# Patient Record
Sex: MALE | Race: WHITE | URBAN - METROPOLITAN AREA
[De-identification: names, ages, dates, MRNs, and addresses within clinical notes are randomized per-mention and may not be internally consistent; named-entity substitution may affect disease eponyms.]

---

## 2018-04-11 ENCOUNTER — OUTPATIENT (OUTPATIENT)
Dept: OUTPATIENT SERVICES | Facility: HOSPITAL | Age: 68
LOS: 1 days | End: 2018-04-11
Payer: MEDICARE

## 2018-04-11 DIAGNOSIS — M54.16 RADICULOPATHY, LUMBAR REGION: ICD-10-CM

## 2018-04-11 PROCEDURE — 62323 NJX INTERLAMINAR LMBR/SAC: CPT

## 2018-04-11 PROCEDURE — 77003 FLUOROGUIDE FOR SPINE INJECT: CPT

## 2019-07-31 ENCOUNTER — OUTPATIENT (OUTPATIENT)
Dept: OUTPATIENT SERVICES | Facility: HOSPITAL | Age: 69
LOS: 1 days | End: 2019-07-31
Payer: MEDICARE

## 2019-07-31 DIAGNOSIS — M54.12 RADICULOPATHY, CERVICAL REGION: ICD-10-CM

## 2019-07-31 PROCEDURE — 77003 FLUOROGUIDE FOR SPINE INJECT: CPT

## 2019-07-31 PROCEDURE — 62321 NJX INTERLAMINAR CRV/THRC: CPT

## 2020-09-14 ENCOUNTER — APPOINTMENT (OUTPATIENT)
Dept: ORTHOPEDIC SURGERY | Facility: CLINIC | Age: 70
End: 2020-09-14
Payer: MEDICARE

## 2020-09-14 VITALS — WEIGHT: 160 LBS | BODY MASS INDEX: 23.7 KG/M2 | HEIGHT: 69 IN

## 2020-09-14 PROCEDURE — 72100 X-RAY EXAM L-S SPINE 2/3 VWS: CPT

## 2020-09-14 PROCEDURE — 99214 OFFICE O/P EST MOD 30 MIN: CPT

## 2020-09-14 PROCEDURE — 72170 X-RAY EXAM OF PELVIS: CPT

## 2020-09-14 NOTE — PHYSICAL EXAM
[de-identified] : Constitutional\par o Appearance : well-nourished, well developed, alert, in no acute distress \par Head and Face\par o Head :\par ¦ Inspection : atraumatic, normocephalic\par o Face :\par ¦ Inspection : no visible rash or discoloration\par Respiratory\par o Respiratory Effort: breathing unlabored \par Neurologic\par o Mental Status Examination :\par ¦ Orientation : alert and oriented X 3\par Psychiatric\par o Mood and Affect: mood normal, affect appropriate\par Cardiovascular\par o Observation/Palpation : - no swelling\par Lymphatic\par o Additional Nodes : No palpable lymph nodes present\par \par Cervical Spine\par o Inspection/Palpation :\par ¦ Inspection : alignment midline, normal degree of lordosis present\par ¦ Skin : normal appearance, no masses or tenderness, trachea midline\par ¦ Palpation : musculature is nontender to palpation\par o Range of Motion : limited rotation to the right, no crepitance or pain with ROM \par \par Lumbosacral Spine\par o Inspection : no visible rash or discoloration\par o Palpation : paraspinal musculature nontender\par o Range of Motion : extension and sidebending cause mild back discomfort but no radicular pain, rotation to the left causes discomfort\par o Muscle Strength : paraspinal muscle strength and tone within normal limits\par o Muscle Tone : paraspinal muscle strength and tone within normal limits\par Tests: straight leg test negative, SCOTT test negative\par  \par Right Lower Extremity\par o Buttock : no tenderness, swelling or deformities\par o Right Hip :\par ¦ Inspection/Palpation : no tenderness, no greater trochanteric or IT band tenderness, no swelling or deformities\par ¦ Range of Motion : full and painless in all planes, no crepitance\par ¦ Stability : joint stability intact\par ¦ Strength : extension, flexion, adduction, abduction, internal rotation and external rotation, 4-/5 \par \par Left Lower Extremity\par o Buttock : no tenderness, swelling or deformities \par o Left Hip :\par ¦ Inspection/Palpation : no tenderness, no greater trochanteric or IT band tenderness, no swelling or deformities\par ¦ Range of Motion : full and painless in all planes, no crepitance\par ¦ Stability : joint stability intact\par ¦ Strength : extension, flexion, adduction, abduction, internal rotation and external rotation, 4-/5\par  \par Gait: gait normal, no significant extremity swelling or lymphedema, no drop foot, tight hamstrings bilaterally, reasonably good core strength\par \par Radiology Results \par o Lumbosacral Spine : AP and lateral views were obtained and revealed grade 1 spondylolisthesis of L5 on S1, diffuse degenerative disc disease worse at L5/S1, T12/L1 and L1/L2, there is mild scoliosis, evidence of an open heart surgery with sternal wires present.\par o Pelvis: AP pelvis was obtained and revealed moderate arthritis of both hips, no lytic lesions of either femur or pelvis.

## 2020-09-14 NOTE — HISTORY OF PRESENT ILLNESS
[de-identified] : 70 year old male presents for follow up of his low back pain. He also complains of bilateral hand numbness. He is s/p cervical spine fusion done in 2016. He has been seen for these issues in the past. He is known to have cervical and lumbar radiculopathy and neuropathy. He had been doing PT until the COVID-19 pandemic and was unable to continue. He feels that his back discomfort has gotten worse, especially on the right side. He notes pain when standing and walking for long periods of time. He has occasional numbness and tingling into his legs but never past his knees, right worse than left. He does not have pain when sleeping at night. He would like to go back to PT. He has not been diligent with home exercises. He has had injections into his cervical and lumbar spine, most recently about 1 year ago. He notes that he has lost weight since his last visit, about 5 pounds. Pmhx: He is s/p bypass surgery, CABG x 4 in 2015. His last cardiac stent was in December 2019.  He is known to have a chronic neuropathy due to this cervical fusion and understands that his symptoms will not improve with treatment.  They however can be managed with activity modification and exercise

## 2020-09-14 NOTE — DISCUSSION/SUMMARY
[de-identified] : I discussed the underlying pathophysiology of the patient's condition in great detail with the patient. I went over the patient's x-rays with them in great detail. We discussed the use of ice, Tylenol and anti-inflammatories to relieve pain. The patient was instructed in ROM exercises they are to do at home. At-home strengthening, and stretching exercises were discussed and demonstrated with the patient. We went over the wide ranging benefits of exercise for the patient health and I encouraged him to begin a diligent exercise program. He should focus on light weight and high repetition exercises. He needs to avoid high-impact activities such as running and jumping. I recommend alternative activities such as riding a stationary bike on low tension, or the elliptical. He should avoid arching and twisting. At this time, he will start a course of physical therapy for strengthening and flexibility. A prescription for physical therapy was provided. All of his questions were answered. He understands and consents to the plan. \par \par FU 1 month.\par after undergoing PT for his low back.

## 2020-09-14 NOTE — ADDENDUM
[FreeTextEntry1] : I, Moe Genao, acted solely as a scribe for Dr. Dakotah Swann on this date 09/14/2020.\par All medical record entries made by the Scribe were at my, Dr. Dakotah Swann, direction and personally dictated by me on 09/14/2020. I have reviewed the chart and agree that the record accurately reflects my personal performance of the history, physical exam, assessment and plan. I have also personally directed, reviewed, and agreed with the chart.

## 2020-10-26 ENCOUNTER — APPOINTMENT (OUTPATIENT)
Dept: ORTHOPEDIC SURGERY | Facility: CLINIC | Age: 70
End: 2020-10-26
Payer: MEDICARE

## 2020-10-26 VITALS — BODY MASS INDEX: 23.7 KG/M2 | WEIGHT: 160 LBS | HEIGHT: 69 IN

## 2020-10-26 DIAGNOSIS — M43.10 SPONDYLOLISTHESIS, SITE UNSPECIFIED: ICD-10-CM

## 2020-10-26 PROCEDURE — 99214 OFFICE O/P EST MOD 30 MIN: CPT

## 2020-10-26 PROCEDURE — 99072 ADDL SUPL MATRL&STAF TM PHE: CPT

## 2020-12-10 ENCOUNTER — APPOINTMENT (OUTPATIENT)
Dept: ORTHOPEDIC SURGERY | Facility: CLINIC | Age: 70
End: 2020-12-10

## 2021-07-30 ENCOUNTER — APPOINTMENT (OUTPATIENT)
Dept: ORTHOPEDIC SURGERY | Facility: CLINIC | Age: 71
End: 2021-07-30

## 2021-08-11 ENCOUNTER — RESULT REVIEW (OUTPATIENT)
Age: 71
End: 2021-08-11

## 2021-09-30 ENCOUNTER — APPOINTMENT (OUTPATIENT)
Dept: ORTHOPEDIC SURGERY | Facility: CLINIC | Age: 71
End: 2021-09-30
Payer: MEDICARE

## 2021-09-30 DIAGNOSIS — M41.9 SCOLIOSIS, UNSPECIFIED: ICD-10-CM

## 2021-09-30 DIAGNOSIS — M47.816 SPONDYLOSIS W/OUT MYELOPATHY OR RADICULOPATHY, LUMBAR REGION: ICD-10-CM

## 2021-09-30 DIAGNOSIS — M51.36 OTHER INTERVERTEBRAL DISC DEGENERATION, LUMBAR REGION: ICD-10-CM

## 2021-09-30 DIAGNOSIS — M14.68: ICD-10-CM

## 2021-09-30 DIAGNOSIS — M48.061 SPINAL STENOSIS, LUMBAR REGION WITHOUT NEUROGENIC CLAUDICATION: ICD-10-CM

## 2021-09-30 PROCEDURE — 99214 OFFICE O/P EST MOD 30 MIN: CPT

## 2021-09-30 PROCEDURE — 72170 X-RAY EXAM OF PELVIS: CPT

## 2021-09-30 PROCEDURE — 72100 X-RAY EXAM L-S SPINE 2/3 VWS: CPT

## 2021-09-30 NOTE — HISTORY OF PRESENT ILLNESS
[de-identified] : 71 year old male presents complaining of low back pain. He notes radiating pain into his legs if he stands for a long time. He has numbness and tingling into feet. He notes that he is having a lot of trouble walking. He has been losing his balance and does fall. He uses a cane mostly in his left hand. He is walking very carefully and is hesitant on slopes. He considers his left side better than his right. He has been going to PT but does not feel the treatment is appropriate. \par He also complains of bilateral hand numbness. He is known to have chronic neuropathy secondary to cervical stenosis s/p cervical spine fusion in 2016. He understands that his neck symptoms will not improve with treatment. They however can be managed with activity modification and exercise. He is on Neurontin, and has had injections into his cervical and lumbar spine in the past. Pmhx: He is s/p CABG x 4 in 2015. His last cardiac stent was 12/2019. He is on baby aspirin and no other blood thinners. He had cataract surgery 3 weeks ago.

## 2021-09-30 NOTE — DISCUSSION/SUMMARY
[de-identified] : I discussed the underlying pathophysiology of the patient's condition in great detail with the patient. I went over the patient's x-rays with them in great detail. I not think repeat EMG/NCS are appropriate at this time. At this time, I am recommending a course of physical therapy with an intensive sports therapist for strengthening and flexibility. I am sending him to [facility]. A prescription was provided. All of his questions were answered. He understands and consents to the plan.\par \par FU 1 month.\par after undergoing PT for his lower back.

## 2021-09-30 NOTE — ADDENDUM
[FreeTextEntry1] : I, Moe Genao, acted solely as a scribe for Dr. Dakotah Swann on this date 09/30/2021.\par All medical record entries made by the Scribe were at my, Dr. Dakotah Swann, direction and personally dictated by me on 09/30/2021. I have reviewed the chart and agree that the record accurately reflects my personal performance of the history, physical exam, assessment and plan. I have also personally directed, reviewed, and agreed with the chart.

## 2021-09-30 NOTE — PHYSICAL EXAM
[de-identified] : Constitutional\par o Appearance : well-nourished, well developed, alert, in no acute distress \par Head and Face\par o Head :\par ¦ Inspection : atraumatic, normocephalic\par o Face :\par ¦ Inspection : no visible rash or discoloration\par Respiratory\par o Respiratory Effort: breathing unlabored \par Neurologic\par o Mental Status Examination :\par ¦ Orientation : alert and oriented X 3\par Psychiatric\par o Mood and Affect: mood normal, affect appropriate\par Cardiovascular\par o Observation/Palpation : - no swelling\par Lymphatic\par o Additional Nodes : No palpable lymph nodes present\par \par Cervical Spine\par o Inspection/Palpation :\par ¦ Inspection : alignment midline, normal degree of lordosis present\par ¦ Skin : normal appearance, no masses or tenderness, trachea midline\par ¦ Palpation : musculature is nontender to palpation\par o Range of Motion : limited rotation to the right, no crepitance or pain with ROM \par \par Lumbosacral Spine\par o Inspection : no visible rash or discoloration\par o Palpation : paraspinal musculature nontender\par o Range of Motion : extension causes discomfort, sidebending to the left causes buttock discomfort, rotation causes lower back discomfort\par o Muscle Strength : paraspinal muscle strength and tone within normal limits\par o Muscle Tone : paraspinal muscle strength and tone within normal limits\par Tests: straight leg test negative, SCOTT test negative\par  \par Right Lower Extremity\par o Buttock : no tenderness, swelling or deformities\par o Right Hip :\par ¦ Inspection/Palpation : no greater trochanteric or IT band tenderness, no swelling or deformities\par ¦ Range of Motion : full and painless in all planes, no crepitance\par ¦ Stability : joint stability intact\par ¦ Strength : extension, flexion 4-/5, adduction, abduction, internal rotation and external rotation\par \par o Right Knee :\par ¦ Strength : hamstrings 4-/5, extension 5/5\par \par Left Lower Extremity\par o Buttock : no tenderness, swelling or deformities \par o Left Hip :\par ¦ Inspection/Palpation : no greater trochanteric or IT band tenderness, no swelling or deformities\par ¦ Range of Motion : full and painless in all planes, no crepitance\par ¦ Stability : joint stability intact\par ¦ Strength : extension, flexion, adduction, abduction, internal rotation and external rotation, 4-/5\par \par o Left Knee\par ¦ Strength : hamstrings 4/5, extension 5/5 \par Gait: short choppy steps, poor balance, mildly positive Romberg's test, no significant extremity swelling or lymphedema, no drop foot bilaterally but lacks full dorsiflexion of the right foot, symmetrical sensation to light touch, very tight hamstrings bilaterally, good core strength\par \par Radiology Results\par o Lumbosacral Spine : AP and lateral views were obtained and revealed a grade 1-2 spondylolisthesis of L5 on S1 with severe DDD at L5/S1. Degenerative disc disease diffusely from the lower thoracic and upper lumbar spine. Diffuse foraminal stenosis. Degenerative scoliosis.\par o Hip and Pelvis: AP pelvis was obtained and revealed mild degenerative arthritis of both hips.

## 2022-02-16 ENCOUNTER — APPOINTMENT (OUTPATIENT)
Dept: MRI IMAGING | Facility: CLINIC | Age: 72
End: 2022-02-16
Payer: MEDICARE

## 2022-02-16 PROCEDURE — 74183 MRI ABD W/O CNTR FLWD CNTR: CPT | Mod: MH

## 2022-02-16 PROCEDURE — A9585: CPT

## 2022-10-01 ENCOUNTER — EMERGENCY (EMERGENCY)
Facility: HOSPITAL | Age: 72
LOS: 1 days | Discharge: HOME | End: 2022-10-01
Attending: INTERNAL MEDICINE | Admitting: INTERNAL MEDICINE
Payer: MEDICARE

## 2022-10-01 VITALS
OXYGEN SATURATION: 97 % | DIASTOLIC BLOOD PRESSURE: 62 MMHG | RESPIRATION RATE: 16 BRPM | HEART RATE: 63 BPM | SYSTOLIC BLOOD PRESSURE: 106 MMHG | TEMPERATURE: 99 F

## 2022-10-01 VITALS
RESPIRATION RATE: 16 BRPM | DIASTOLIC BLOOD PRESSURE: 56 MMHG | HEIGHT: 64 IN | HEART RATE: 77 BPM | SYSTOLIC BLOOD PRESSURE: 119 MMHG | OXYGEN SATURATION: 96 % | WEIGHT: 160.06 LBS

## 2022-10-01 PROCEDURE — 72100 X-RAY EXAM L-S SPINE 2/3 VWS: CPT

## 2022-10-01 PROCEDURE — 99284 EMERGENCY DEPT VISIT MOD MDM: CPT

## 2022-10-01 PROCEDURE — 99284 EMERGENCY DEPT VISIT MOD MDM: CPT | Mod: 25

## 2022-10-01 PROCEDURE — 73030 X-RAY EXAM OF SHOULDER: CPT

## 2022-10-01 PROCEDURE — 73030 X-RAY EXAM OF SHOULDER: CPT | Mod: 26,50

## 2022-10-01 PROCEDURE — 72100 X-RAY EXAM L-S SPINE 2/3 VWS: CPT | Mod: 26

## 2022-10-01 RX ORDER — TRAMADOL HYDROCHLORIDE 50 MG/1
25 TABLET ORAL ONCE
Refills: 0 | Status: DISCONTINUED | OUTPATIENT
Start: 2022-10-01 | End: 2022-10-01

## 2022-10-01 RX ORDER — TRAMADOL HYDROCHLORIDE 50 MG/1
1 TABLET ORAL
Qty: 15 | Refills: 0
Start: 2022-10-01 | End: 2022-10-05

## 2022-10-01 RX ADMIN — TRAMADOL HYDROCHLORIDE 25 MILLIGRAM(S): 50 TABLET ORAL at 03:25

## 2022-10-01 RX ADMIN — TRAMADOL HYDROCHLORIDE 25 MILLIGRAM(S): 50 TABLET ORAL at 03:34

## 2022-10-01 NOTE — ED ADULT TRIAGE NOTE - BP NONINVASIVE SYSTOLIC (MM HG)
119 Hypokalemia, hypophosphatemia, hypomagnesemia  IV & PO supplementation  *Note: Mag stable at 1.4 (10/17 & 10/18) will continue current PO regimen without change

## 2022-10-01 NOTE — ED ADULT TRIAGE NOTE - CHIEF COMPLAINT QUOTE
covid + as of 9/29,  s/p mechanical trip and fall getting out of bed c/o back and right shoulder pain

## 2022-10-01 NOTE — ED ADULT NURSE NOTE - OBJECTIVE STATEMENT
received pt stable awake  alert oriented x4 no distress noted pt  s/p fall and c/o rt shoulder pain and back pain  pt evaluated  awaiting xray

## 2022-10-01 NOTE — ED PROVIDER NOTE - CLINICAL SUMMARY MEDICAL DECISION MAKING FREE TEXT BOX
S/P fall 24 hours ago, patient is ambulatory, x-rays with marked degenerative changes, no acute fx, improved with Tramadol, stable on discharge, ortho referral and rheumatology referral given.

## 2022-10-01 NOTE — ED ADULT NURSE REASSESSMENT NOTE - NS ED NURSE REASSESS COMMENT FT1
pt reavaluated and xray done medicated with tramadol po as ordered and d/c home to follow up awaiting wife to pck up

## 2022-10-01 NOTE — ED ADULT NURSE NOTE - NSIMPLEMENTINTERV_GEN_ALL_ED
Implemented All Fall Risk Interventions:  Council to call system. Call bell, personal items and telephone within reach. Instruct patient to call for assistance. Room bathroom lighting operational. Non-slip footwear when patient is off stretcher. Physically safe environment: no spills, clutter or unnecessary equipment. Stretcher in lowest position, wheels locked, appropriate side rails in place. Provide visual cue, wrist band, yellow gown, etc. Monitor gait and stability. Monitor for mental status changes and reorient to person, place, and time. Review medications for side effects contributing to fall risk. Reinforce activity limits and safety measures with patient and family.

## 2022-10-01 NOTE — ED PROVIDER NOTE - SIGNIFICANT NEGATIVE FINDINGS
no headache, no neck pain,  no chest pain, no SOB, no palpitations, no n/v, no bladder no bowel changes,  no neuro changes.

## 2022-10-01 NOTE — ED PROVIDER NOTE - PROVIDER TOKENS
PROVIDER:[TOKEN:[[id number]],FOLLOWUP:[1-3 Days]],PROVIDER:[TOKEN:[[id number]],FOLLOWUP:[1-3 Days]]

## 2022-10-01 NOTE — ED PROVIDER NOTE - NSFOLLOWUPINSTRUCTIONS_ED_ALL_ED_FT
1) Follow-up with your Primary Medical Doctor or referred doctor. Call today / next business day for prompt follow-up.  2) With back pain, whether it is a new pain or a chronic pain, it is very important to have close, prompt outpatient follow-up for continued workup, evaluation and definitive diagnosis.  If you develop worsening or persistent pain, any numbness, tingling, weakness of one or both of your legs, any fever, or any changes / difficulty urinating then you will need to see your back doctor immediately or you can return to the emergency room.  Many times your doctor will need to set you up for further imaging / testing such as an MRI.  3) See attached instruction sheets for additional information, including information regarding signs and symptoms to look out for, reasons to seek immediate care and other important instructions.  4) Follow-up with your Orthopedist or Neurosurgeon as soon as possible. If you do not have one then you will need to call the referred doctor as soon as possible (today / next business day) for prompt follow-up for further workup and treatment. See the referred doctor for information. orthopedic and rheumatology referrals given. Call for an appointment with the specialists   1) Follow-up with your Primary Medical Doctor or referred doctor. Call today / next business day for prompt follow-up.  2) With back pain, whether it is a new pain or a chronic pain, it is very important to have close, prompt outpatient follow-up for continued workup, evaluation and definitive diagnosis.  If you develop worsening or persistent pain, any numbness, tingling, weakness of one or both of your legs, any fever, or any changes / difficulty urinating then you will need to see your back doctor immediately or you can return to the emergency room.  Many times your doctor will need to set you up for further imaging / testing such as an MRI.  3) See attached instruction sheets for additional information, including information regarding signs and symptoms to look out for, reasons to seek immediate care and other important instructions.  4) Follow-up with your Orthopedist or Neurosurgeon as soon as possible. If you do not have one then you will need to call the referred doctor as soon as possible (today / next business day) for prompt follow-up for further workup and treatment. See the referred doctor for information.

## 2022-10-01 NOTE — ED PROVIDER NOTE - OBJECTIVE STATEMENT
fell 24 hours ago, couldn't sleep last night  B/L shoulder and back pain 71 y/o male h/o arthritis C/C fell 24 hours ago, couldn't sleep last night due to  B/L shoulder and back pain, he has been ambulatory since the fall. no headache, no neck pain,  no chest pain, no SOB, no palpitations, no n/v, no bladder no bowel changes,  no neuro changes.

## 2022-10-01 NOTE — ED PROVIDER NOTE - CARE PROVIDER_API CALL
Chary Burciaga)  Orthopaedic Surgery Surgery  [address] [place] [address]  Phone: [phone]  Fax: [phone]  Follow Up Time: 1-3 Days    Rayo Noonan)  Internal Medicine; Rheumatology  [address] [place] [address]  Phone: [phone]  Fax: [phone]  Follow Up Time: 1-3 Days

## 2022-10-01 NOTE — ED PROVIDER NOTE - CONSTITUTIONAL, MLM
Well appearing, awake, alert, oriented to person, place, time/situation and in mild to moderate  distress. normal...

## 2022-10-01 NOTE — ED PROVIDER NOTE - MUSCULOSKELETAL, MLM
Lumbar Spine, tenderness, range of motion is not limited, B/L shoulder  joint tenderness, ROM not limited, neurovascular exam normal

## 2023-04-17 NOTE — ED PROVIDER NOTE - WR ORDER DATE AND TIME 2
Abdomen , soft, nontender, nondistended , no guarding or rigidity , no masses palpable , normal bowel sounds , Liver and Spleen , no hepatomegaly present , no hepatosplenomegaly , liver nontender , spleen not palpable , PEG site CDI. 16 Fr PEG functioning well
01-Oct-2022 00:47

## 2023-04-27 ENCOUNTER — APPOINTMENT (OUTPATIENT)
Dept: SURGICAL ONCOLOGY | Facility: CLINIC | Age: 73
End: 2023-04-27
Payer: MEDICARE

## 2023-04-27 VITALS
DIASTOLIC BLOOD PRESSURE: 70 MMHG | SYSTOLIC BLOOD PRESSURE: 181 MMHG | HEIGHT: 67 IN | BODY MASS INDEX: 23.54 KG/M2 | TEMPERATURE: 97.8 F | HEART RATE: 57 BPM | RESPIRATION RATE: 17 BRPM | WEIGHT: 150 LBS | OXYGEN SATURATION: 98 %

## 2023-04-27 PROCEDURE — 99205 OFFICE O/P NEW HI 60 MIN: CPT

## 2023-05-05 ENCOUNTER — OUTPATIENT (OUTPATIENT)
Dept: OUTPATIENT SERVICES | Facility: HOSPITAL | Age: 73
LOS: 1 days | End: 2023-05-05
Payer: MEDICARE

## 2023-05-05 ENCOUNTER — RESULT REVIEW (OUTPATIENT)
Age: 73
End: 2023-05-05

## 2023-05-05 DIAGNOSIS — C80.1 MALIGNANT (PRIMARY) NEOPLASM, UNSPECIFIED: ICD-10-CM

## 2023-05-05 PROCEDURE — 88321 CONSLTJ&REPRT SLD PREP ELSWR: CPT

## 2023-05-15 LAB — SURGICAL PATHOLOGY STUDY: SIGNIFICANT CHANGE UP

## 2023-05-30 ENCOUNTER — OUTPATIENT (OUTPATIENT)
Dept: OUTPATIENT SERVICES | Facility: HOSPITAL | Age: 73
LOS: 1 days | End: 2023-05-30
Payer: MEDICARE

## 2023-05-30 VITALS
TEMPERATURE: 99 F | SYSTOLIC BLOOD PRESSURE: 169 MMHG | OXYGEN SATURATION: 97 % | HEART RATE: 61 BPM | HEIGHT: 66 IN | DIASTOLIC BLOOD PRESSURE: 85 MMHG | RESPIRATION RATE: 16 BRPM | WEIGHT: 155.65 LBS

## 2023-05-30 DIAGNOSIS — Z98.890 OTHER SPECIFIED POSTPROCEDURAL STATES: Chronic | ICD-10-CM

## 2023-05-30 DIAGNOSIS — Z95.5 PRESENCE OF CORONARY ANGIOPLASTY IMPLANT AND GRAFT: ICD-10-CM

## 2023-05-30 DIAGNOSIS — E78.5 HYPERLIPIDEMIA, UNSPECIFIED: ICD-10-CM

## 2023-05-30 DIAGNOSIS — Z01.818 ENCOUNTER FOR OTHER PREPROCEDURAL EXAMINATION: ICD-10-CM

## 2023-05-30 DIAGNOSIS — I10 ESSENTIAL (PRIMARY) HYPERTENSION: ICD-10-CM

## 2023-05-30 DIAGNOSIS — Z98.2 PRESENCE OF CEREBROSPINAL FLUID DRAINAGE DEVICE: ICD-10-CM

## 2023-05-30 DIAGNOSIS — Z95.5 PRESENCE OF CORONARY ANGIOPLASTY IMPLANT AND GRAFT: Chronic | ICD-10-CM

## 2023-05-30 DIAGNOSIS — C43.59 MALIGNANT MELANOMA OF OTHER PART OF TRUNK: ICD-10-CM

## 2023-05-30 DIAGNOSIS — Z98.1 ARTHRODESIS STATUS: Chronic | ICD-10-CM

## 2023-05-30 DIAGNOSIS — I25.10 ATHEROSCLEROTIC HEART DISEASE OF NATIVE CORONARY ARTERY WITHOUT ANGINA PECTORIS: ICD-10-CM

## 2023-05-30 DIAGNOSIS — Z98.2 PRESENCE OF CEREBROSPINAL FLUID DRAINAGE DEVICE: Chronic | ICD-10-CM

## 2023-05-30 DIAGNOSIS — Z95.1 PRESENCE OF AORTOCORONARY BYPASS GRAFT: Chronic | ICD-10-CM

## 2023-05-30 DIAGNOSIS — E11.9 TYPE 2 DIABETES MELLITUS WITHOUT COMPLICATIONS: ICD-10-CM

## 2023-05-30 DIAGNOSIS — K21.9 GASTRO-ESOPHAGEAL REFLUX DISEASE WITHOUT ESOPHAGITIS: ICD-10-CM

## 2023-05-30 LAB
ANION GAP SERPL CALC-SCNC: 9 MMOL/L — SIGNIFICANT CHANGE UP (ref 5–17)
BUN SERPL-MCNC: 15 MG/DL — SIGNIFICANT CHANGE UP (ref 7–23)
CALCIUM SERPL-MCNC: 9.1 MG/DL — SIGNIFICANT CHANGE UP (ref 8.4–10.5)
CHLORIDE SERPL-SCNC: 100 MMOL/L — SIGNIFICANT CHANGE UP (ref 96–108)
CO2 SERPL-SCNC: 28 MMOL/L — SIGNIFICANT CHANGE UP (ref 22–31)
CREAT SERPL-MCNC: 0.83 MG/DL — SIGNIFICANT CHANGE UP (ref 0.5–1.3)
EGFR: 92 ML/MIN/1.73M2 — SIGNIFICANT CHANGE UP
GLUCOSE SERPL-MCNC: 92 MG/DL — SIGNIFICANT CHANGE UP (ref 70–99)
HCT VFR BLD CALC: 43.7 % — SIGNIFICANT CHANGE UP (ref 39–50)
HGB BLD-MCNC: 14.8 G/DL — SIGNIFICANT CHANGE UP (ref 13–17)
MCHC RBC-ENTMCNC: 31.2 PG — SIGNIFICANT CHANGE UP (ref 27–34)
MCHC RBC-ENTMCNC: 33.9 GM/DL — SIGNIFICANT CHANGE UP (ref 32–36)
MCV RBC AUTO: 92 FL — SIGNIFICANT CHANGE UP (ref 80–100)
NRBC # BLD: 0 /100 WBCS — SIGNIFICANT CHANGE UP (ref 0–0)
PLATELET # BLD AUTO: 216 K/UL — SIGNIFICANT CHANGE UP (ref 150–400)
POTASSIUM SERPL-MCNC: 3.8 MMOL/L — SIGNIFICANT CHANGE UP (ref 3.5–5.3)
POTASSIUM SERPL-SCNC: 3.8 MMOL/L — SIGNIFICANT CHANGE UP (ref 3.5–5.3)
RBC # BLD: 4.75 M/UL — SIGNIFICANT CHANGE UP (ref 4.2–5.8)
RBC # FLD: 13.8 % — SIGNIFICANT CHANGE UP (ref 10.3–14.5)
SODIUM SERPL-SCNC: 137 MMOL/L — SIGNIFICANT CHANGE UP (ref 135–145)
WBC # BLD: 6.8 K/UL — SIGNIFICANT CHANGE UP (ref 3.8–10.5)
WBC # FLD AUTO: 6.8 K/UL — SIGNIFICANT CHANGE UP (ref 3.8–10.5)

## 2023-05-30 PROCEDURE — 80048 BASIC METABOLIC PNL TOTAL CA: CPT

## 2023-05-30 PROCEDURE — 85027 COMPLETE CBC AUTOMATED: CPT

## 2023-05-30 PROCEDURE — G0463: CPT

## 2023-05-30 PROCEDURE — 36415 COLL VENOUS BLD VENIPUNCTURE: CPT

## 2023-05-30 NOTE — H&P PST ADULT - ATTENDING COMMENTS
73-year-old man with a >0.5 mm melanoma of the right upper back scheduled for excision, with reconstruction by Dr. Drake Matute    oncologic diagnosis, surgical approach, risks, benefits and possible surgical outcomes reviewed in detail, all questions answered.    consent on chart

## 2023-05-30 NOTE — H&P PST ADULT - HISTORY OF PRESENT ILLNESS
74yo male with medical h/o T2DM, HTN, HDL, CAD w/stent LAD on Aspirin, Depression and  shunt placed 10/2022 for Hydrocephalus. Pt reports Melanoma right upper back, presents today for PST for scheduled Wide Excision Melanoma Right Upper Back on 6/7/2023 74yo male with medical h/o T2DM, HTN, HDL, GERD, CAD w/stent LAD on Aspirin, Depression and  shunt placed 10/2022 for Hydrocephalus. Pt reports Melanoma right upper back, presents today for PST for scheduled Wide Excision Melanoma Right Upper Back on 6/7/2023

## 2023-05-30 NOTE — H&P PST ADULT - NSANTHOSAYNRD_GEN_A_CORE
neck 15.5inches/No. AMAURI screening performed.  STOP BANG Legend: 0-2 = LOW Risk; 3-4 = INTERMEDIATE Risk; 5-8 = HIGH Risk

## 2023-05-30 NOTE — H&P PST ADULT - NSICDXPASTMEDICALHX_GEN_ALL_CORE_FT
PAST MEDICAL HISTORY:  CAD (coronary artery disease)     GERD (gastroesophageal reflux disease)     H/O: depression     Hydrocephalus     Hyperlipidemia     Hypertension     Type 2 diabetes mellitus

## 2023-05-30 NOTE — H&P PST ADULT - NSICDXPASTSURGICALHX_GEN_ALL_CORE_FT
PAST SURGICAL HISTORY:  S/P bilateral inguinal hernia repair     S/P cervical spinal fusion     S/P quadruple vessel bypass     S/P  shunt     Stented coronary artery

## 2023-05-30 NOTE — H&P PST ADULT - PROBLEM SELECTOR PLAN 1
Pre-op instructions given. Pt verbalized understanding  Chlorhexidine wash instructions given  Pt instructed to hold all NSAIDs + herbal supplements/vitamins 7 days before surgery  HOLD all diabetic meds + Accu-Chek ordered STAT for day of surgery  HOLD Invokana 3-5 days before surgery   Pending: Cardiology clearance in chart + copy of ecg  Pending: Neurosurgeon's clearance for h/o  shunt placed 10/2023  Pending: lab results         Pt to HOLD all anticoagulant as instructed by PCP

## 2023-05-30 NOTE — H&P PST ADULT - PROBLEM SELECTOR PLAN 6
HOLD all diabetic meds + Accu-Chek ordered STAT for day of surgery  HOLD Invokana 3-5 days before surgery

## 2023-06-01 ENCOUNTER — APPOINTMENT (OUTPATIENT)
Dept: PLASTIC SURGERY | Facility: CLINIC | Age: 73
End: 2023-06-01
Payer: MEDICARE

## 2023-06-01 VITALS
HEART RATE: 58 BPM | OXYGEN SATURATION: 97 % | TEMPERATURE: 98.2 F | BODY MASS INDEX: 23.95 KG/M2 | SYSTOLIC BLOOD PRESSURE: 150 MMHG | HEIGHT: 66 IN | WEIGHT: 149 LBS | DIASTOLIC BLOOD PRESSURE: 76 MMHG

## 2023-06-01 DIAGNOSIS — N40.0 BENIGN PROSTATIC HYPERPLASIA WITHOUT LOWER URINARY TRACT SYMPMS: ICD-10-CM

## 2023-06-01 DIAGNOSIS — Z86.61 PERSONAL HISTORY OF INFECTIONS OF THE CENTRAL NERVOUS SYSTEM: ICD-10-CM

## 2023-06-01 DIAGNOSIS — Z80.49 FAMILY HISTORY OF MALIGNANT NEOPLASM OF OTHER GENITAL ORGANS: ICD-10-CM

## 2023-06-01 DIAGNOSIS — Z80.8 FAMILY HISTORY OF MALIGNANT NEOPLASM OF OTHER ORGANS OR SYSTEMS: ICD-10-CM

## 2023-06-01 DIAGNOSIS — E78.00 PURE HYPERCHOLESTEROLEMIA, UNSPECIFIED: ICD-10-CM

## 2023-06-01 DIAGNOSIS — K21.9 GASTRO-ESOPHAGEAL REFLUX DISEASE W/OUT ESOPHAGITIS: ICD-10-CM

## 2023-06-01 PROCEDURE — 99204 OFFICE O/P NEW MOD 45 MIN: CPT

## 2023-06-06 ENCOUNTER — TRANSCRIPTION ENCOUNTER (OUTPATIENT)
Age: 73
End: 2023-06-06

## 2023-06-06 RX ORDER — UBIDECARENONE 100 MG
2 CAPSULE ORAL
Refills: 0 | DISCHARGE

## 2023-06-06 RX ORDER — ICOSAPENT ETHYL 500 MG/1
2 CAPSULE, LIQUID FILLED ORAL
Refills: 0 | DISCHARGE

## 2023-06-06 NOTE — ASU DISCHARGE PLAN (ADULT/PEDIATRIC) - ASU DC SPECIAL INSTRUCTIONSFT
Initial followup with plastic surgery in the next 5-15 days, regarding matters of bandages, activities, and showering.    Dr. Cunha should call with pathology report in approximately 2 weeks.  The conversation will determine further management. 1. Please follow up with Dr. Matute's PA, Bridgette, next week TUESDAY 06/13/23 for your first post operative visit. Your appointment has already been made. Please call the office if you need to make any changes to your appointment at [phone] (during normal business hours M-F 9-5pm).   ----  Dr. Cunha will call with pathology results in approximately 2 weeks, the conversation will determine further management and follow up.  ----  2. Activity:   Please avoid any strenuous activities, AVOID bending, stretching, reaching overhead, or any heavy lifting.  ----  3. Dressings: Please keep everything on until you come for your first post operative visit.   Some OOZING and blood on the dressing is normal and to be expected. If it becomes saturated w/ BRIGHT red blood that does not stop, please call [phone] for email BRIDGETTE: [email or web address]  ----  4. Medications:  Your medications were sent to your pharmacy.  Please take the prescribed medications as directed.   For MILD pain please take regular over the counter pain medications such as: Advil, Tylenol, Motrin.   Only take the narcotic prescribed pain medication for SEVERE PAIN.   If constipation occurs after taking narcotic pain medications, please take an over the counter stool softener.

## 2023-06-06 NOTE — ASU DISCHARGE PLAN (ADULT/PEDIATRIC) - NS MD DC FALL RISK RISK
For information on Fall & Injury Prevention, visit: [email or web address] OR  [email or web address] OR  https://www.cdc.gov/steadi/patient.html

## 2023-06-06 NOTE — ASU DISCHARGE PLAN (ADULT/PEDIATRIC) - COMMENTS
Dr. Matute's  direct phone number is [phone]. If after office hours (9-5PM M-F), then please wait until normal business hours to call.   You may leave a detailed VM as well. You may also email [email or web address] for any concerns or questions regarding scheduling appointments.  You will see Dr. Matute's CLAUDE Angeles, (Guille ARCE) for your post operative visit.  You may also contact her directly via email: [email or web address] for any concerns or questions.

## 2023-06-06 NOTE — ASU DISCHARGE PLAN (ADULT/PEDIATRIC) - PROCEDURE
. Excision of right back melanoma with plastics closure . Excision of right back melanoma with plastics closure by Dr. Cunha & Dr. Matute - Dr. Matute's office: [phone]. Your first post-operative appt is on 06/13/23 TUESDAY with Dr. Matute's PABridgette at 10:00am.

## 2023-06-06 NOTE — CONSULT LETTER
[Dear  ___] : Dear  [unfilled], [Consult Letter:] : I had the pleasure of evaluating your patient, [unfilled]. [Please see my note below.] : Please see my note below. [Consult Closing:] : Thank you very much for allowing me to participate in the care of this patient.  If you have any questions, please do not hesitate to contact me. [Sincerely,] : Sincerely, [FreeTextEntry3] : Drake Matute MD, FACS

## 2023-06-06 NOTE — ASU PATIENT PROFILE, ADULT - NSICDXPASTSURGICALHX_GEN_ALL_CORE_FT
PAST SURGICAL HISTORY:  S/P bilateral inguinal hernia repair     S/P cervical spinal fusion no hardware    S/P quadruple vessel bypass     S/P  shunt     Stented coronary artery 3 stents

## 2023-06-06 NOTE — REASON FOR VISIT
[Consultation] : a consultation visit [Spouse] : spouse [FreeTextEntry1] : Dr. Osito Cunha (Surgical Oncology)

## 2023-06-06 NOTE — ASU DISCHARGE PLAN (ADULT/PEDIATRIC) - ACTIVITY LEVEL
No excercise/No heavy lifting/No sports/gym/Elevate extremity No excercise/No heavy lifting/No sports/gym/Elevate extremity/No tub baths/No intercourse

## 2023-06-06 NOTE — ASU PATIENT PROFILE, ADULT - NSICDXPASTMEDICALHX_GEN_ALL_CORE_FT
PAST MEDICAL HISTORY:  CAD (coronary artery disease)     GERD (gastroesophageal reflux disease)     H/O: depression mild    Hydrocephalus     Hyperlipidemia     Hypertension     Type 2 diabetes mellitus prediabetes     PAST MEDICAL HISTORY:  2019 novel coronavirus disease (COVID-19)     CAD (coronary artery disease)     GERD (gastroesophageal reflux disease)     H/O: depression mild    Hydrocephalus     Hyperlipidemia     Hypertension     Type 2 diabetes mellitus prediabetes

## 2023-06-07 ENCOUNTER — TRANSCRIPTION ENCOUNTER (OUTPATIENT)
Age: 73
End: 2023-06-07

## 2023-06-07 ENCOUNTER — RESULT REVIEW (OUTPATIENT)
Age: 73
End: 2023-06-07

## 2023-06-07 ENCOUNTER — APPOINTMENT (OUTPATIENT)
Dept: SURGICAL ONCOLOGY | Facility: HOSPITAL | Age: 73
End: 2023-06-07

## 2023-06-07 ENCOUNTER — APPOINTMENT (OUTPATIENT)
Dept: PLASTIC SURGERY | Facility: HOSPITAL | Age: 73
End: 2023-06-07

## 2023-06-07 ENCOUNTER — OUTPATIENT (OUTPATIENT)
Dept: OUTPATIENT SERVICES | Facility: HOSPITAL | Age: 73
LOS: 1 days | End: 2023-06-07
Payer: MEDICARE

## 2023-06-07 VITALS
WEIGHT: 155.65 LBS | DIASTOLIC BLOOD PRESSURE: 78 MMHG | TEMPERATURE: 98 F | SYSTOLIC BLOOD PRESSURE: 149 MMHG | RESPIRATION RATE: 15 BRPM | HEART RATE: 62 BPM | OXYGEN SATURATION: 97 % | HEIGHT: 66 IN

## 2023-06-07 VITALS
HEART RATE: 52 BPM | TEMPERATURE: 97 F | SYSTOLIC BLOOD PRESSURE: 150 MMHG | OXYGEN SATURATION: 98 % | RESPIRATION RATE: 16 BRPM | DIASTOLIC BLOOD PRESSURE: 75 MMHG

## 2023-06-07 DIAGNOSIS — Z98.2 PRESENCE OF CEREBROSPINAL FLUID DRAINAGE DEVICE: Chronic | ICD-10-CM

## 2023-06-07 DIAGNOSIS — Z95.5 PRESENCE OF CORONARY ANGIOPLASTY IMPLANT AND GRAFT: Chronic | ICD-10-CM

## 2023-06-07 DIAGNOSIS — Z95.1 PRESENCE OF AORTOCORONARY BYPASS GRAFT: Chronic | ICD-10-CM

## 2023-06-07 DIAGNOSIS — Z98.1 ARTHRODESIS STATUS: Chronic | ICD-10-CM

## 2023-06-07 DIAGNOSIS — Z01.818 ENCOUNTER FOR OTHER PREPROCEDURAL EXAMINATION: ICD-10-CM

## 2023-06-07 DIAGNOSIS — C43.59 MALIGNANT MELANOMA OF OTHER PART OF TRUNK: ICD-10-CM

## 2023-06-07 DIAGNOSIS — Z98.890 OTHER SPECIFIED POSTPROCEDURAL STATES: Chronic | ICD-10-CM

## 2023-06-07 LAB — GLUCOSE BLDC GLUCOMTR-MCNC: 126 MG/DL — HIGH (ref 70–99)

## 2023-06-07 PROCEDURE — 88305 TISSUE EXAM BY PATHOLOGIST: CPT

## 2023-06-07 PROCEDURE — 88305 TISSUE EXAM BY PATHOLOGIST: CPT | Mod: 26

## 2023-06-07 PROCEDURE — 11604 EXC TR-EXT MAL+MARG 3.1-4 CM: CPT

## 2023-06-07 PROCEDURE — 21932 EXC BACK TUM DEEP < 5 CM: CPT

## 2023-06-07 PROCEDURE — 82962 GLUCOSE BLOOD TEST: CPT

## 2023-06-07 PROCEDURE — 15734 MUSCLE-SKIN GRAFT TRUNK: CPT | Mod: XP

## 2023-06-07 PROCEDURE — 15734 MUSCLE-SKIN GRAFT TRUNK: CPT

## 2023-06-07 PROCEDURE — C1889: CPT

## 2023-06-07 DEVICE — ARISTA 3GR: Type: IMPLANTABLE DEVICE | Site: RIGHT | Status: FUNCTIONAL

## 2023-06-07 RX ORDER — HYDROMORPHONE HYDROCHLORIDE 2 MG/ML
0.25 INJECTION INTRAMUSCULAR; INTRAVENOUS; SUBCUTANEOUS
Refills: 0 | Status: DISCONTINUED | OUTPATIENT
Start: 2023-06-07 | End: 2023-06-07

## 2023-06-07 RX ORDER — SODIUM CHLORIDE 9 MG/ML
1000 INJECTION, SOLUTION INTRAVENOUS
Refills: 0 | Status: DISCONTINUED | OUTPATIENT
Start: 2023-06-07 | End: 2023-06-07

## 2023-06-07 RX ORDER — OXYCODONE AND ACETAMINOPHEN 5; 325 MG/1; MG/1
1 TABLET ORAL
Qty: 3 | Refills: 0
Start: 2023-06-07 | End: 2023-06-07

## 2023-06-07 RX ORDER — HYDROMORPHONE HYDROCHLORIDE 2 MG/ML
0.5 INJECTION INTRAMUSCULAR; INTRAVENOUS; SUBCUTANEOUS
Refills: 0 | Status: DISCONTINUED | OUTPATIENT
Start: 2023-06-07 | End: 2023-06-07

## 2023-06-07 RX ORDER — ONDANSETRON 8 MG/1
4 TABLET, FILM COATED ORAL ONCE
Refills: 0 | Status: DISCONTINUED | OUTPATIENT
Start: 2023-06-07 | End: 2023-06-07

## 2023-06-07 RX ORDER — HEPARIN SODIUM 5000 [USP'U]/ML
5000 INJECTION INTRAVENOUS; SUBCUTANEOUS ONCE
Refills: 0 | Status: COMPLETED | OUTPATIENT
Start: 2023-06-07 | End: 2023-06-07

## 2023-06-07 RX ADMIN — SODIUM CHLORIDE 50 MILLILITER(S): 9 INJECTION, SOLUTION INTRAVENOUS at 06:05

## 2023-06-07 RX ADMIN — HEPARIN SODIUM 5000 UNIT(S): 5000 INJECTION INTRAVENOUS; SUBCUTANEOUS at 07:14

## 2023-06-07 NOTE — CHART NOTE - NSCHARTNOTEFT_GEN_A_CORE
PRE OPERATIVE NOTE    Pre-op Diagnosis: melanoma on back  Procedure: wide excision of melanoma  Surgeon: Dr. Cunha    1: Melanoma of the Skin - Biopsy   Specimen   Procedure: Biopsy, shave   Specimen Laterality: Right   Tumor   Tumor Site: Skin of trunk - Upper back   Histologic Type: Melanoma, not otherwise classified   Maximum Tumor (Breslow) Thickness (Millimeters): At least 0.5 mm   The lesion extends to the base of the specimen.   Ulceration: Not identified   Mitotic Rate: None identified   Microsatellite(s): Not identified   Lymphovascular Invasion: Not identified   Neurotropism: Not identified   Tumor-Infiltrating Lymphocytes: Present, brisk   Tumor Regression: Present   Margins   Margin Status for Invasive Melanoma: Invasive melanoma present at   margin   Margin(s) Involved by Invasive Melanoma: Peripheral; Deep   Margin Status for Melanoma in situ: Melanoma in situ present at   margin   Margin(s) Involved by Melanoma in Situ: Peripheral   Pathologic Stage Classification (pTNM, AJCC 8th Edition)   pT Category: pT1a       A/P: 73y Male with >0.5mm melanoma at right upper back  -plan for wide excision with 1cm margins  -PRS to perform closure

## 2023-06-07 NOTE — BRIEF OPERATIVE NOTE - OPERATION/FINDINGS
s/p wide-excisional biopsy of back melanoma by Dr. Cunha.  The wound on the back was reconstructed with       I was present for the entire length of the case, there was no other qualified resident to assist  I assisted with hemostasis, exposure, creating of flaps, closure of wound, and placement of dressings. s/p wide-excisional biopsy of back melanoma by Dr. Cunha.  The wound on the back was reconstructed with local advancement flaps.  reymundo was used in wound.      I was present for the entire length of the case, there was no other qualified resident to assist  I assisted with hemostasis, exposure, creating of flaps, closure of wound, and placement of dressings.

## 2023-06-07 NOTE — BRIEF OPERATIVE NOTE - NSICDXBRIEFPREOP_GEN_ALL_CORE_FT
PRE-OP DIAGNOSIS:  Melanoma of back 07-Jun-2023 07:25:02  Guille Garcia  
PRE-OP DIAGNOSIS:  Melanoma of back 07-Jun-2023 07:25:02  Guille Garcia

## 2023-06-07 NOTE — ASU PREOP CHECKLIST - PATIENT'S PERSONAL PROPERTY REMOVED
Message   Recorded as Task   Date: 12/12/2016 03:17 PM, Created By: Kevin Hair   Task Name: Call Back   Assigned To: Kristy Cassidy   Regarding Patient: Nico Guzman, Status: Active   Comment:    Kristy Cassidy - 12 Dec 2016 3:17 PM     TASK CREATED  Caller: Self; Other; [phone] (Home)  Rec'd call from patient  States that she is having pain again  States that her pain is the same as before, "the sciatic pain"; she scales her pain 8-9/10 in the morning; using Tylenol  She had right SIJ injection on 7/27/16 and 8/24/16  She had right piriformis injection on 9/14/16  Please advise - schedule for injection or OV first?   Rebecca Pat - 12 Dec 2016 3:33 PM     TASK REPLIED TO: Previously Assigned To Rebecca Pat                      repeat right piriformis injection if that provided relief and she would like to repeat at this point    thanks! Kristy Cassidy - 14 Dec 2016 3:29 PM     TASK EDITED  Spoke with patient; she is scheduled for right piriformis injection on 12/21/16 @0845  Reviewed instructions: , NPO 1 hour prior, if ill/start abx to call and reschedule, loose-fitting/comfortable clothing  She states verbal understanding 
n/a

## 2023-06-07 NOTE — BRIEF OPERATIVE NOTE - NSICDXBRIEFPOSTOP_GEN_ALL_CORE_FT
POST-OP DIAGNOSIS:  Melanoma of back 07-Jun-2023 07:25:06  Guille Garcia  
POST-OP DIAGNOSIS:  Melanoma of back 07-Jun-2023 07:25:06  Guille Garcia

## 2023-06-07 NOTE — BRIEF OPERATIVE NOTE - SPECIMENS
see Dr. Cunha's note. (1) 3 in TOTAL: 1. Dr. Cunha's specimen, 2. 12 o clock additional 3. 6 o clock additional.

## 2023-06-08 ENCOUNTER — NON-APPOINTMENT (OUTPATIENT)
Age: 73
End: 2023-06-08

## 2023-06-13 ENCOUNTER — APPOINTMENT (OUTPATIENT)
Dept: PLASTIC SURGERY | Facility: CLINIC | Age: 73
End: 2023-06-13
Payer: MEDICARE

## 2023-06-13 VITALS
HEART RATE: 66 BPM | OXYGEN SATURATION: 97 % | WEIGHT: 149 LBS | TEMPERATURE: 98.2 F | SYSTOLIC BLOOD PRESSURE: 153 MMHG | DIASTOLIC BLOOD PRESSURE: 78 MMHG | BODY MASS INDEX: 23.95 KG/M2 | HEIGHT: 66 IN

## 2023-06-13 PROBLEM — G91.9 HYDROCEPHALUS, UNSPECIFIED: Chronic | Status: ACTIVE | Noted: 2023-05-30

## 2023-06-13 PROBLEM — Z86.59 PERSONAL HISTORY OF OTHER MENTAL AND BEHAVIORAL DISORDERS: Chronic | Status: ACTIVE | Noted: 2023-05-30

## 2023-06-13 PROBLEM — U07.1 COVID-19: Chronic | Status: ACTIVE | Noted: 2023-06-06

## 2023-06-13 PROBLEM — E11.9 TYPE 2 DIABETES MELLITUS WITHOUT COMPLICATIONS: Chronic | Status: ACTIVE | Noted: 2023-05-30

## 2023-06-13 PROBLEM — K21.9 GASTRO-ESOPHAGEAL REFLUX DISEASE WITHOUT ESOPHAGITIS: Chronic | Status: ACTIVE | Noted: 2023-05-30

## 2023-06-13 PROBLEM — E78.5 HYPERLIPIDEMIA, UNSPECIFIED: Chronic | Status: ACTIVE | Noted: 2023-05-30

## 2023-06-13 PROBLEM — I10 ESSENTIAL (PRIMARY) HYPERTENSION: Chronic | Status: ACTIVE | Noted: 2023-05-30

## 2023-06-13 PROBLEM — I25.10 ATHEROSCLEROTIC HEART DISEASE OF NATIVE CORONARY ARTERY WITHOUT ANGINA PECTORIS: Chronic | Status: ACTIVE | Noted: 2023-05-30

## 2023-06-13 PROCEDURE — 99024 POSTOP FOLLOW-UP VISIT: CPT

## 2023-06-20 LAB — SURGICAL PATHOLOGY STUDY: SIGNIFICANT CHANGE UP

## 2023-06-22 ENCOUNTER — APPOINTMENT (OUTPATIENT)
Dept: PLASTIC SURGERY | Facility: CLINIC | Age: 73
End: 2023-06-22
Payer: MEDICARE

## 2023-06-22 VITALS
HEART RATE: 76 BPM | TEMPERATURE: 97.9 F | OXYGEN SATURATION: 98 % | SYSTOLIC BLOOD PRESSURE: 135 MMHG | DIASTOLIC BLOOD PRESSURE: 63 MMHG

## 2023-06-22 PROCEDURE — 99024 POSTOP FOLLOW-UP VISIT: CPT

## 2023-06-29 ENCOUNTER — OUTPATIENT (OUTPATIENT)
Dept: OUTPATIENT SERVICES | Facility: HOSPITAL | Age: 73
LOS: 1 days | End: 2023-06-29
Payer: MEDICARE

## 2023-06-29 ENCOUNTER — TRANSCRIPTION ENCOUNTER (OUTPATIENT)
Age: 73
End: 2023-06-29

## 2023-06-29 ENCOUNTER — APPOINTMENT (OUTPATIENT)
Dept: ULTRASOUND IMAGING | Facility: IMAGING CENTER | Age: 73
End: 2023-06-29
Payer: MEDICARE

## 2023-06-29 ENCOUNTER — APPOINTMENT (OUTPATIENT)
Dept: RADIOLOGY | Facility: IMAGING CENTER | Age: 73
End: 2023-06-29
Payer: MEDICARE

## 2023-06-29 DIAGNOSIS — Z98.2 PRESENCE OF CEREBROSPINAL FLUID DRAINAGE DEVICE: Chronic | ICD-10-CM

## 2023-06-29 DIAGNOSIS — Z95.5 PRESENCE OF CORONARY ANGIOPLASTY IMPLANT AND GRAFT: Chronic | ICD-10-CM

## 2023-06-29 DIAGNOSIS — Z98.1 ARTHRODESIS STATUS: Chronic | ICD-10-CM

## 2023-06-29 DIAGNOSIS — C43.59 MALIGNANT MELANOMA OF OTHER PART OF TRUNK: ICD-10-CM

## 2023-06-29 DIAGNOSIS — Z95.1 PRESENCE OF AORTOCORONARY BYPASS GRAFT: Chronic | ICD-10-CM

## 2023-06-29 DIAGNOSIS — Z98.890 OTHER SPECIFIED POSTPROCEDURAL STATES: Chronic | ICD-10-CM

## 2023-06-29 PROCEDURE — 76882 US LMTD JT/FCL EVL NVASC XTR: CPT | Mod: 26,RT

## 2023-06-29 PROCEDURE — 71046 X-RAY EXAM CHEST 2 VIEWS: CPT | Mod: 26

## 2023-06-29 PROCEDURE — 76882 US LMTD JT/FCL EVL NVASC XTR: CPT

## 2023-06-29 PROCEDURE — 71046 X-RAY EXAM CHEST 2 VIEWS: CPT

## 2023-07-05 ENCOUNTER — APPOINTMENT (OUTPATIENT)
Dept: PLASTIC SURGERY | Facility: CLINIC | Age: 73
End: 2023-07-05
Payer: MEDICARE

## 2023-07-05 VITALS
TEMPERATURE: 98.4 F | WEIGHT: 150 LBS | HEART RATE: 61 BPM | SYSTOLIC BLOOD PRESSURE: 144 MMHG | DIASTOLIC BLOOD PRESSURE: 69 MMHG | HEIGHT: 67 IN | OXYGEN SATURATION: 95 % | BODY MASS INDEX: 23.54 KG/M2

## 2023-07-05 PROCEDURE — 99024 POSTOP FOLLOW-UP VISIT: CPT

## 2023-07-28 NOTE — REASON FOR VISIT
[Initial Consultation] : an initial consultation for [Other: _____] : [unfilled] [FreeTextEntry2] : Recently diagnosed >0.5 mm melanoma of the right upper back

## 2023-07-28 NOTE — REVIEW OF SYSTEMS
[Negative] : Heme/Lymph [FreeTextEntry5] : Hypertension [FreeTextEntry8] : Heartburn [FreeTextEntry9] : BPH [de-identified] : Scoliosis [de-identified] : Melanoma [de-identified] : Sciatica

## 2023-07-28 NOTE — HISTORY OF PRESENT ILLNESS
[de-identified] : 72-year-old man.\par \par Referred by dermatology (Dr. Guru SILVERMAN).\par \par Newly diagnosed >0.5 mm melanoma of his UPPER RIGHT BACK.\par \par This was an asymptomatic pigmented lesion of unclear duration identified on annual skin examination.\par \par \par + Prior personal history:\par ~2010: Mohs procedure on his right cheek by Dr. Marc Silverman.\par No other skin cancers.\par \par No other personal history of malignancy.\par \par \par + FH:\par Father: "Skin cancer".\par Mother: Uterine cancer.\par \par \par Derm: Dr. Guru SILVERMAN.\par \par \par PMD: Dr. Soy GUZMAN.\par \par \par NKDA\par \par \par No pacemaker or defibrillator.\par No anticoagulants.\par \par +81 mg aspirin daily.\par \par + Hypertension.\par He takes losartan and metoprolol.\par \par Cardiology: Dr. Edwardo LOUIE.\par \par 2015: CABG x4 at [facility].\par 2018: PTCA with stent placement at [facility] in [place].\par \par October 2022 he presented to [facility] with memory problems and difficulty walking.\par He was diagnosed with encephalitis.\par He had placement of a ventriculoperitoneal shunt, "embolization", and a transient drain.\par Neurosurgery: Dr. Nahum ROMANO.\par \par + Elevated hemoglobin A1c.\par He takes daily Invokana.\par Endocrinology: Dr. Crow STEVEN\par \par + Hypercholesterolemia.\par He takes Zocor, and Vascepa.\par \par + GERD.\par Treated with omeprazole and Prilosec.\par \par + BPH.\par Treated with saw palmetto caps.\par Urology: Dr. Bharat MIRANDA\par \par + History of lumbar disc disease.\par Orthopedics: Dr. Dakotah MENA.\par Medications include diclofenac, glucosamine, and Advil.\par \par + Scoliosis.\par In the past he has seen Dr. Howard Daniels (spine/Ortho).\par \par He takes Viibryd.\par Used to see Dr. Medina.\par Now renewed by his internist.\par \par \par December 2022 eye examination with Dr. Lionel GOODWIN identified no worrisome findings.\par \par \par 2018 colonoscopy with GI (Dr. Prabhjot THEODORE) was okay.\par I suggested a follow-up visit.

## 2023-07-28 NOTE — ASSESSMENT
[FreeTextEntry1] : \par 72-year-old man.\par \par Referred with a >0.5 mm melanoma of his right upper back.\par \par Diagnosis reviewed.\par \par He had a chest x-ray April 2023 with his internist that was normal.\par No need to repeat the study presently.\par \par I explained the indications and technique for excision, which would be an outpatient procedure coordinated with plastic surgery due to the anatomic location of the melanoma, and the anticipated size of the defect.\par \par I also explained the possibility of upstaging on surgical pathology since the melanoma extends to the base of the biopsy specimen.\par \par Reviewed in detail, all questions answered.\par \par They understand and would like to proceed with the above operation.\par Paperwork for surgical scheduling submitted\par \par Note dictated to his referring physicians.\par \par \par 06/21/2023.\par I called him and we spoke on the phone.\par June 7, 2023, he had wide excision of a >0.5 mm melanoma of his right upper back.\par Surgical pathology: UPSTAGED his melanoma.\par T = 1.9 mm.\par Negative margins.\par \par I have suggested the following:\par 1.  Chest x-ray.\par 2.  Right axillary sonogram.\par 3.  Consideration of sentinel node biopsy, if above imaging is normal.\par \par He understands and agrees.\par Prescription entered for the diagnostic imaging.\par He and I will speak after those have been done.\par \par Note dictated to his physicians\par \par \par 6/29/2023:\par Imaging at 450:\par 1.  Chest x-ray: No acute radiographic findings.\par 2.  Right axillary sonogram: No adenopathy.\par \par \par 07/05/2023.\par I called him.\par We discussed the above imaging results.\par Presently he is undecided about either having additional surgery in the form of a slightly wider excision with sentinel node biopsy, or expectant management with serial clinical examinations, semiannual axillary sonograms, and annual pulmonary imaging.\par \par He will call me once he has made a decision.\par \par \par 7/24/2023.\par He and I spoke.\par He is decided that he would like to proceed with a wider excision of the melanoma from the right upper back, with sentinel node biopsy.\par Paperwork for surgical scheduling submitted.

## 2023-07-28 NOTE — PHYSICAL EXAM
[Normal] : supple, no neck mass and thyroid not enlarged [Normal Neck Lymph Nodes] : normal neck lymph nodes  [Normal Supraclavicular Lymph Nodes] : normal supraclavicular lymph nodes [Normal Axillary Lymph Nodes] : normal axillary lymph nodes [Normal] : full range of motion and no deformities appreciated [de-identified] : BELOW

## 2023-08-10 ENCOUNTER — APPOINTMENT (OUTPATIENT)
Dept: PLASTIC SURGERY | Facility: CLINIC | Age: 73
End: 2023-08-10
Payer: MEDICARE

## 2023-08-10 VITALS
SYSTOLIC BLOOD PRESSURE: 139 MMHG | BODY MASS INDEX: 23.54 KG/M2 | DIASTOLIC BLOOD PRESSURE: 75 MMHG | HEART RATE: 63 BPM | HEIGHT: 67 IN | WEIGHT: 150 LBS | TEMPERATURE: 97.8 F | OXYGEN SATURATION: 98 %

## 2023-08-10 PROCEDURE — 99024 POSTOP FOLLOW-UP VISIT: CPT

## 2023-09-14 ENCOUNTER — OUTPATIENT (OUTPATIENT)
Dept: OUTPATIENT SERVICES | Facility: HOSPITAL | Age: 73
LOS: 1 days | End: 2023-09-14
Payer: MEDICARE

## 2023-09-14 VITALS
HEIGHT: 66.5 IN | HEART RATE: 56 BPM | WEIGHT: 154.76 LBS | RESPIRATION RATE: 16 BRPM | SYSTOLIC BLOOD PRESSURE: 142 MMHG | TEMPERATURE: 98 F | OXYGEN SATURATION: 97 % | DIASTOLIC BLOOD PRESSURE: 88 MMHG

## 2023-09-14 DIAGNOSIS — I10 ESSENTIAL (PRIMARY) HYPERTENSION: ICD-10-CM

## 2023-09-14 DIAGNOSIS — Z01.818 ENCOUNTER FOR OTHER PREPROCEDURAL EXAMINATION: ICD-10-CM

## 2023-09-14 DIAGNOSIS — Z98.2 PRESENCE OF CEREBROSPINAL FLUID DRAINAGE DEVICE: ICD-10-CM

## 2023-09-14 DIAGNOSIS — Z98.890 OTHER SPECIFIED POSTPROCEDURAL STATES: Chronic | ICD-10-CM

## 2023-09-14 DIAGNOSIS — I25.10 ATHEROSCLEROTIC HEART DISEASE OF NATIVE CORONARY ARTERY WITHOUT ANGINA PECTORIS: ICD-10-CM

## 2023-09-14 DIAGNOSIS — Z95.5 PRESENCE OF CORONARY ANGIOPLASTY IMPLANT AND GRAFT: ICD-10-CM

## 2023-09-14 DIAGNOSIS — E78.5 HYPERLIPIDEMIA, UNSPECIFIED: ICD-10-CM

## 2023-09-14 DIAGNOSIS — Z98.1 ARTHRODESIS STATUS: Chronic | ICD-10-CM

## 2023-09-14 DIAGNOSIS — Z98.2 PRESENCE OF CEREBROSPINAL FLUID DRAINAGE DEVICE: Chronic | ICD-10-CM

## 2023-09-14 DIAGNOSIS — C43.59 MALIGNANT MELANOMA OF OTHER PART OF TRUNK: ICD-10-CM

## 2023-09-14 DIAGNOSIS — E11.9 TYPE 2 DIABETES MELLITUS WITHOUT COMPLICATIONS: ICD-10-CM

## 2023-09-14 DIAGNOSIS — K21.9 GASTRO-ESOPHAGEAL REFLUX DISEASE WITHOUT ESOPHAGITIS: ICD-10-CM

## 2023-09-14 DIAGNOSIS — Z95.5 PRESENCE OF CORONARY ANGIOPLASTY IMPLANT AND GRAFT: Chronic | ICD-10-CM

## 2023-09-14 DIAGNOSIS — Z95.1 PRESENCE OF AORTOCORONARY BYPASS GRAFT: Chronic | ICD-10-CM

## 2023-09-14 LAB
ALBUMIN SERPL ELPH-MCNC: 4.3 G/DL — SIGNIFICANT CHANGE UP (ref 3.3–5)
ALP SERPL-CCNC: 67 U/L — SIGNIFICANT CHANGE UP (ref 40–120)
ALT FLD-CCNC: 19 U/L — SIGNIFICANT CHANGE UP (ref 10–45)
ANION GAP SERPL CALC-SCNC: 8 MMOL/L — SIGNIFICANT CHANGE UP (ref 5–17)
AST SERPL-CCNC: 16 U/L — SIGNIFICANT CHANGE UP (ref 10–40)
BILIRUB SERPL-MCNC: 0.6 MG/DL — SIGNIFICANT CHANGE UP (ref 0.2–1.2)
BUN SERPL-MCNC: 15 MG/DL — SIGNIFICANT CHANGE UP (ref 7–23)
CALCIUM SERPL-MCNC: 9.1 MG/DL — SIGNIFICANT CHANGE UP (ref 8.4–10.5)
CHLORIDE SERPL-SCNC: 99 MMOL/L — SIGNIFICANT CHANGE UP (ref 96–108)
CO2 SERPL-SCNC: 28 MMOL/L — SIGNIFICANT CHANGE UP (ref 22–31)
CREAT SERPL-MCNC: 0.69 MG/DL — SIGNIFICANT CHANGE UP (ref 0.5–1.3)
EGFR: 97 ML/MIN/1.73M2 — SIGNIFICANT CHANGE UP
GLUCOSE SERPL-MCNC: 91 MG/DL — SIGNIFICANT CHANGE UP (ref 70–99)
HCT VFR BLD CALC: 42.9 % — SIGNIFICANT CHANGE UP (ref 39–50)
HGB BLD-MCNC: 14.3 G/DL — SIGNIFICANT CHANGE UP (ref 13–17)
MCHC RBC-ENTMCNC: 30.9 PG — SIGNIFICANT CHANGE UP (ref 27–34)
MCHC RBC-ENTMCNC: 33.3 GM/DL — SIGNIFICANT CHANGE UP (ref 32–36)
MCV RBC AUTO: 92.7 FL — SIGNIFICANT CHANGE UP (ref 80–100)
NRBC # BLD: 0 /100 WBCS — SIGNIFICANT CHANGE UP (ref 0–0)
PLATELET # BLD AUTO: 223 K/UL — SIGNIFICANT CHANGE UP (ref 150–400)
POTASSIUM SERPL-MCNC: 3.8 MMOL/L — SIGNIFICANT CHANGE UP (ref 3.5–5.3)
POTASSIUM SERPL-SCNC: 3.8 MMOL/L — SIGNIFICANT CHANGE UP (ref 3.5–5.3)
PROT SERPL-MCNC: 7.2 G/DL — SIGNIFICANT CHANGE UP (ref 6–8.3)
RBC # BLD: 4.63 M/UL — SIGNIFICANT CHANGE UP (ref 4.2–5.8)
RBC # FLD: 13.8 % — SIGNIFICANT CHANGE UP (ref 10.3–14.5)
SODIUM SERPL-SCNC: 135 MMOL/L — SIGNIFICANT CHANGE UP (ref 135–145)
WBC # BLD: 6.86 K/UL — SIGNIFICANT CHANGE UP (ref 3.8–10.5)
WBC # FLD AUTO: 6.86 K/UL — SIGNIFICANT CHANGE UP (ref 3.8–10.5)

## 2023-09-14 PROCEDURE — 80053 COMPREHEN METABOLIC PANEL: CPT

## 2023-09-14 PROCEDURE — 36415 COLL VENOUS BLD VENIPUNCTURE: CPT

## 2023-09-14 PROCEDURE — G0463: CPT

## 2023-09-14 PROCEDURE — 85027 COMPLETE CBC AUTOMATED: CPT

## 2023-09-14 NOTE — H&P PST ADULT - PAIN SITE
PT requesting Dr Curtis, seen by her last year. Pt called and agreed on appt  With Dr Curtis 220 2/21   denies

## 2023-09-14 NOTE — H&P PST ADULT - HISTORY OF PRESENT ILLNESS
72yo male with medical h/o T2DM, HTN, HDL, GERD, CAD w/stent LADx3 on Aspirin + #+years, Depression and pmshx CABG and  shunt placed 10/2022 for Hydrocephalus. Pt reports Melanoma right upper back, presents today for PST for scheduled Wide Excision Melanoma Right Upper Back on 9/20/2023

## 2023-09-14 NOTE — H&P PST ADULT - NSICDXPASTMEDICALHX_GEN_ALL_CORE_FT
PAST MEDICAL HISTORY:  2019 novel coronavirus disease (COVID-19)     CAD (coronary artery disease)     GERD (gastroesophageal reflux disease)     H/O: depression mild    Hydrocephalus     Hyperlipidemia     Hypertension     Type 2 diabetes mellitus prediabetes

## 2023-09-14 NOTE — H&P PST ADULT - ATTENDING PHYSICIAN: I HAVE REVIEWED THE CLINICAL DOCUMENTATION AND AGREE WITH THE ABOVE NOTE
Push fluids and rest at home. Start the [employer]. Follow up with neurology. Return to the ER with new or worsening symptoms or other concerns. Yes Statement Selected

## 2023-09-14 NOTE — H&P PST ADULT - ATTENDING COMMENTS
73-year-old man recently upstaged from a 0.5 mm to a 1.9 mm melanoma, on wide excision of the former.    He is scheduled for a wider excision with sentinel node biopsy and reconstruction by Dr. Drake Matute.    oncologic diagnosis, surgical approach, risks, benefits and possible surgical outcomes reviewed in detail, all questions answered.    Consent on chart

## 2023-09-14 NOTE — H&P PST ADULT - PROBLEM SELECTOR PLAN 1
Pre-op instructions given. Pt verbalized understanding  Chlorhexidine wash instructions given  Pt instructed to hold all NSAIDs + herbal supplements/vitamins 7 days before surgery  Pt instructed to HOLD all diabetic meds + Accu-Chek ordered STAT for day of surgery  Pending: lab result  Pending: Cardiology clearance + copy of ecg

## 2023-09-14 NOTE — H&P PST ADULT - NSANTHOSAYNRD_GEN_A_CORE
neck 16.5inches/No. AMAURI screening performed.  STOP BANG Legend: 0-2 = LOW Risk; 3-4 = INTERMEDIATE Risk; 5-8 = HIGH Risk

## 2023-09-14 NOTE — H&P PST ADULT - PATIENT'S PREFERRED PRONOUN
[facility]    Date: 10/2/2020  Patient Name: Dutch Burroughs        MRN: [id number]    Account #: [de-identified]  : 2018  (1 y.o.)  Gender: male     REASON FOR MISSED TREATMENT:    []Cancel due to [address] pandemic    [x]Cancelled due to pt's dad's condition. Dad still unable to drive due to recovering from back surgery. Spoke to pt's dad. Plan to be here next week unless his doctor tells him he cannot drive (at doctor appointment next Tuesday.)  [] Therapist Canceled Appointment  []Cancelled due to other appointment   []No Show / No call. Pt's guardian called with next scheduled appointment. [] Cancelled due to transportation conflict  []Cancelled due to weather  []Frequency of order changed  []Patient on hold due to:   [] Excused absence d/t at least 48 hour notice of cancellation  []Cancel /less than 48 hour notice.     []OTHER:      Electronically signed by:    Anthony Rogers M.A., CCC/SLP             Date:10/2/2020
Him/He

## 2023-09-14 NOTE — H&P PST ADULT - NSICDXPASTSURGICALHX_GEN_ALL_CORE_FT
PAST SURGICAL HISTORY:  H/O melanoma excision     S/P bilateral inguinal hernia repair     S/P cervical spinal fusion no hardware    S/P quadruple vessel bypass     S/P  shunt     Stented coronary artery 3 stents

## 2023-09-19 ENCOUNTER — TRANSCRIPTION ENCOUNTER (OUTPATIENT)
Age: 73
End: 2023-09-19

## 2023-09-20 ENCOUNTER — APPOINTMENT (OUTPATIENT)
Dept: SURGICAL ONCOLOGY | Facility: HOSPITAL | Age: 73
End: 2023-09-20

## 2023-09-20 ENCOUNTER — RESULT REVIEW (OUTPATIENT)
Age: 73
End: 2023-09-20

## 2023-09-20 ENCOUNTER — TRANSCRIPTION ENCOUNTER (OUTPATIENT)
Age: 73
End: 2023-09-20

## 2023-09-20 ENCOUNTER — APPOINTMENT (OUTPATIENT)
Dept: PLASTIC SURGERY | Facility: HOSPITAL | Age: 73
End: 2023-09-20

## 2023-09-20 ENCOUNTER — OUTPATIENT (OUTPATIENT)
Dept: OUTPATIENT SERVICES | Facility: HOSPITAL | Age: 73
LOS: 1 days | End: 2023-09-20
Payer: MEDICARE

## 2023-09-20 VITALS
OXYGEN SATURATION: 97 % | DIASTOLIC BLOOD PRESSURE: 88 MMHG | HEART RATE: 67 BPM | WEIGHT: 154.76 LBS | TEMPERATURE: 98 F | RESPIRATION RATE: 15 BRPM | SYSTOLIC BLOOD PRESSURE: 155 MMHG | HEIGHT: 66.5 IN

## 2023-09-20 VITALS
HEART RATE: 60 BPM | DIASTOLIC BLOOD PRESSURE: 69 MMHG | TEMPERATURE: 97 F | SYSTOLIC BLOOD PRESSURE: 133 MMHG | RESPIRATION RATE: 16 BRPM | OXYGEN SATURATION: 94 %

## 2023-09-20 DIAGNOSIS — Z98.2 PRESENCE OF CEREBROSPINAL FLUID DRAINAGE DEVICE: Chronic | ICD-10-CM

## 2023-09-20 DIAGNOSIS — Z01.818 ENCOUNTER FOR OTHER PREPROCEDURAL EXAMINATION: ICD-10-CM

## 2023-09-20 DIAGNOSIS — Z98.890 OTHER SPECIFIED POSTPROCEDURAL STATES: Chronic | ICD-10-CM

## 2023-09-20 DIAGNOSIS — Z98.1 ARTHRODESIS STATUS: Chronic | ICD-10-CM

## 2023-09-20 DIAGNOSIS — C43.59 MALIGNANT MELANOMA OF OTHER PART OF TRUNK: ICD-10-CM

## 2023-09-20 DIAGNOSIS — Z95.1 PRESENCE OF AORTOCORONARY BYPASS GRAFT: Chronic | ICD-10-CM

## 2023-09-20 DIAGNOSIS — Z95.5 PRESENCE OF CORONARY ANGIOPLASTY IMPLANT AND GRAFT: Chronic | ICD-10-CM

## 2023-09-20 LAB — GLUCOSE BLDC GLUCOMTR-MCNC: 132 MG/DL — HIGH (ref 70–99)

## 2023-09-20 PROCEDURE — 82962 GLUCOSE BLOOD TEST: CPT

## 2023-09-20 PROCEDURE — 13132 CMPLX RPR F/C/C/M/N/AX/G/H/F: CPT | Mod: XU

## 2023-09-20 PROCEDURE — A9541: CPT

## 2023-09-20 PROCEDURE — 38792 RA TRACER ID OF SENTINL NODE: CPT | Mod: RT

## 2023-09-20 PROCEDURE — 15734 MUSCLE-SKIN GRAFT TRUNK: CPT | Mod: XU

## 2023-09-20 PROCEDURE — 78195 LYMPH SYSTEM IMAGING: CPT | Mod: MD

## 2023-09-20 PROCEDURE — 88341 IMHCHEM/IMCYTCHM EA ADD ANTB: CPT

## 2023-09-20 PROCEDURE — 21931 EXC BACK LES SC 3 CM/>: CPT

## 2023-09-20 PROCEDURE — 15734 MUSCLE-SKIN GRAFT TRUNK: CPT

## 2023-09-20 PROCEDURE — 78195 LYMPH SYSTEM IMAGING: CPT | Mod: 26,MD

## 2023-09-20 PROCEDURE — 88342 IMHCHEM/IMCYTCHM 1ST ANTB: CPT

## 2023-09-20 PROCEDURE — 38525 BIOPSY/REMOVAL LYMPH NODES: CPT | Mod: RT

## 2023-09-20 PROCEDURE — 38900 IO MAP OF SENT LYMPH NODE: CPT

## 2023-09-20 PROCEDURE — 88305 TISSUE EXAM BY PATHOLOGIST: CPT | Mod: 26

## 2023-09-20 PROCEDURE — 88307 TISSUE EXAM BY PATHOLOGIST: CPT

## 2023-09-20 PROCEDURE — C1889: CPT

## 2023-09-20 PROCEDURE — 88305 TISSUE EXAM BY PATHOLOGIST: CPT

## 2023-09-20 PROCEDURE — 88341 IMHCHEM/IMCYTCHM EA ADD ANTB: CPT | Mod: 26

## 2023-09-20 PROCEDURE — 88342 IMHCHEM/IMCYTCHM 1ST ANTB: CPT | Mod: 26

## 2023-09-20 PROCEDURE — 88307 TISSUE EXAM BY PATHOLOGIST: CPT | Mod: 26

## 2023-09-20 PROCEDURE — 11606 EXC TR-EXT MAL+MARG >4 CM: CPT

## 2023-09-20 DEVICE — ARISTA 3GR: Type: IMPLANTABLE DEVICE | Site: RIGHT | Status: FUNCTIONAL

## 2023-09-20 RX ORDER — ACETAMINOPHEN 500 MG
2 TABLET ORAL
Refills: 0 | DISCHARGE

## 2023-09-20 RX ORDER — HYDROMORPHONE HYDROCHLORIDE 2 MG/ML
0.5 INJECTION INTRAMUSCULAR; INTRAVENOUS; SUBCUTANEOUS
Refills: 0 | Status: DISCONTINUED | OUTPATIENT
Start: 2023-09-20 | End: 2023-09-20

## 2023-09-20 RX ORDER — HYDROMORPHONE HYDROCHLORIDE 2 MG/ML
1 INJECTION INTRAMUSCULAR; INTRAVENOUS; SUBCUTANEOUS ONCE
Refills: 0 | Status: DISCONTINUED | OUTPATIENT
Start: 2023-09-20 | End: 2023-09-20

## 2023-09-20 RX ORDER — BACILLUS COAGULANS/INULIN 1B-250 MG
1 CAPSULE ORAL
Refills: 0 | DISCHARGE

## 2023-09-20 RX ORDER — COLESEVELAM HYDROCHLORIDE 625 MG/1
1 TABLET, FILM COATED ORAL
Refills: 0 | DISCHARGE

## 2023-09-20 RX ORDER — UBIDECARENONE 100 MG
2 CAPSULE ORAL
Refills: 0 | DISCHARGE

## 2023-09-20 RX ORDER — SODIUM CHLORIDE 9 MG/ML
1000 INJECTION, SOLUTION INTRAVENOUS
Refills: 0 | Status: DISCONTINUED | OUTPATIENT
Start: 2023-09-20 | End: 2023-09-20

## 2023-09-20 RX ORDER — OMEPRAZOLE 10 MG/1
1 CAPSULE, DELAYED RELEASE ORAL
Refills: 0 | DISCHARGE

## 2023-09-20 RX ORDER — ROSUVASTATIN CALCIUM 5 MG/1
1 TABLET ORAL
Refills: 0 | DISCHARGE

## 2023-09-20 RX ORDER — METOPROLOL TARTRATE 50 MG
1 TABLET ORAL
Refills: 0 | DISCHARGE

## 2023-09-20 RX ORDER — ONDANSETRON 8 MG/1
4 TABLET, FILM COATED ORAL ONCE
Refills: 0 | Status: DISCONTINUED | OUTPATIENT
Start: 2023-09-20 | End: 2023-09-20

## 2023-09-20 RX ORDER — LOSARTAN POTASSIUM 100 MG/1
1 TABLET, FILM COATED ORAL
Refills: 0 | DISCHARGE

## 2023-09-20 RX ORDER — ICOSAPENT ETHYL 500 MG/1
1 CAPSULE, LIQUID FILLED ORAL
Refills: 0 | DISCHARGE

## 2023-09-20 RX ORDER — HEPARIN SODIUM 5000 [USP'U]/ML
5000 INJECTION INTRAVENOUS; SUBCUTANEOUS ONCE
Refills: 0 | Status: COMPLETED | OUTPATIENT
Start: 2023-09-20 | End: 2023-09-20

## 2023-09-20 RX ORDER — VILAZODONE HYDROCHLORIDE 20 MG/1
1 TABLET, FILM COATED ORAL
Refills: 0 | DISCHARGE

## 2023-09-20 RX ORDER — ACETAMINOPHEN 500 MG
650 TABLET ORAL ONCE
Refills: 0 | Status: COMPLETED | OUTPATIENT
Start: 2023-09-20 | End: 2023-09-20

## 2023-09-20 RX ORDER — ASPIRIN/CALCIUM CARB/MAGNESIUM 324 MG
1 TABLET ORAL
Refills: 0 | DISCHARGE

## 2023-09-20 RX ORDER — CANAGLIFLOZIN 100 MG/1
1 TABLET, FILM COATED ORAL
Refills: 0 | DISCHARGE

## 2023-09-20 RX ADMIN — Medication 650 MILLIGRAM(S): at 17:35

## 2023-09-20 RX ADMIN — HEPARIN SODIUM 5000 UNIT(S): 5000 INJECTION INTRAVENOUS; SUBCUTANEOUS at 11:10

## 2023-09-20 RX ADMIN — SODIUM CHLORIDE 50 MILLILITER(S): 9 INJECTION, SOLUTION INTRAVENOUS at 08:29

## 2023-09-20 RX ADMIN — Medication 650 MILLIGRAM(S): at 17:04

## 2023-09-20 NOTE — ASU DISCHARGE PLAN (ADULT/PEDIATRIC) - CARE PROVIDER_API CALL
Drake Matute (MD)  ColonRectal Surgery; Plastic Surgery; Surgery; Surgery of the Hand  [address] [facility], [facility] [address]  [place] [address]  Phone: [phone]  Fax: [phone]  Follow Up Time: 1 week

## 2023-09-20 NOTE — ASU PATIENT PROFILE, ADULT - FALL HARM RISK - HARM RISK INTERVENTIONS

## 2023-09-20 NOTE — BRIEF OPERATIVE NOTE - NSICDXBRIEFPROCEDURE_GEN_ALL_CORE_FT
PROCEDURES:  Excision, melanoma, back, with sentinel lymph node biopsy 20-Sep-2023 14:47:54  Milad Rivera

## 2023-09-20 NOTE — ASU DISCHARGE PLAN (ADULT/PEDIATRIC) - NS MD DC FALL RISK RISK
For information on Fall & Injury Prevention, visit: [email or web address] OR  [email or web address] OR  https://www.cdc.gov/steadi/patient.html

## 2023-09-20 NOTE — ASU DISCHARGE PLAN (ADULT/PEDIATRIC) - ASU DC SPECIAL INSTRUCTIONSFT
Initial followup with plastic surgery in the next 5-15 days, regarding matters of bandages, activities, and showering.    Dr. Cunha should call with pathology report in approximately 2 weeks.  The conversation will determine further management. Initial followup with plastic surgery in the next 5-15 days, regarding matters of bandages, activities, and showering.    Dr. Cunha should call with pathology report in approximately 2 weeks.  The conversation will determine further management.  Can shower tomorrow 9/21  Do not removed dressing until evaluated by surgeon

## 2023-09-21 ENCOUNTER — APPOINTMENT (OUTPATIENT)
Dept: PLASTIC SURGERY | Facility: CLINIC | Age: 73
End: 2023-09-21

## 2023-09-21 VITALS
HEIGHT: 67 IN | WEIGHT: 150 LBS | SYSTOLIC BLOOD PRESSURE: 167 MMHG | BODY MASS INDEX: 23.54 KG/M2 | OXYGEN SATURATION: 98 % | DIASTOLIC BLOOD PRESSURE: 79 MMHG | TEMPERATURE: 98.3 F | HEART RATE: 60 BPM

## 2023-09-28 ENCOUNTER — APPOINTMENT (OUTPATIENT)
Dept: PLASTIC SURGERY | Facility: CLINIC | Age: 73
End: 2023-09-28

## 2023-10-06 ENCOUNTER — APPOINTMENT (OUTPATIENT)
Dept: PLASTIC SURGERY | Facility: CLINIC | Age: 73
End: 2023-10-06
Payer: MEDICARE

## 2023-10-06 VITALS
BODY MASS INDEX: 24.33 KG/M2 | TEMPERATURE: 98.3 F | DIASTOLIC BLOOD PRESSURE: 73 MMHG | SYSTOLIC BLOOD PRESSURE: 131 MMHG | OXYGEN SATURATION: 96 % | WEIGHT: 155 LBS | HEART RATE: 70 BPM | HEIGHT: 67 IN

## 2023-10-06 PROCEDURE — 99024 POSTOP FOLLOW-UP VISIT: CPT

## 2023-10-06 PROCEDURE — 10140 I&D HMTMA SEROMA/FLUID COLLJ: CPT | Mod: 78

## 2023-10-10 LAB — SURGICAL PATHOLOGY STUDY: SIGNIFICANT CHANGE UP

## 2023-10-26 ENCOUNTER — APPOINTMENT (OUTPATIENT)
Dept: PLASTIC SURGERY | Facility: CLINIC | Age: 73
End: 2023-10-26

## 2023-10-26 VITALS
OXYGEN SATURATION: 98 % | TEMPERATURE: 97.7 F | BODY MASS INDEX: 24.33 KG/M2 | HEART RATE: 82 BPM | HEIGHT: 67 IN | WEIGHT: 155 LBS | SYSTOLIC BLOOD PRESSURE: 112 MMHG | DIASTOLIC BLOOD PRESSURE: 67 MMHG

## 2023-11-09 ENCOUNTER — NON-APPOINTMENT (OUTPATIENT)
Age: 73
End: 2023-11-09

## 2023-11-10 ENCOUNTER — OUTPATIENT (OUTPATIENT)
Dept: OUTPATIENT SERVICES | Facility: HOSPITAL | Age: 73
LOS: 1 days | Discharge: HOME | End: 2023-11-10

## 2023-11-10 DIAGNOSIS — C43.30 MALIGNANT MELANOMA OF UNSPECIFIED PART OF FACE: ICD-10-CM

## 2023-11-10 DIAGNOSIS — Z98.890 OTHER SPECIFIED POSTPROCEDURAL STATES: Chronic | ICD-10-CM

## 2023-11-10 DIAGNOSIS — Z98.2 PRESENCE OF CEREBROSPINAL FLUID DRAINAGE DEVICE: Chronic | ICD-10-CM

## 2023-11-10 DIAGNOSIS — Z98.1 ARTHRODESIS STATUS: Chronic | ICD-10-CM

## 2023-11-10 DIAGNOSIS — Z95.5 PRESENCE OF CORONARY ANGIOPLASTY IMPLANT AND GRAFT: Chronic | ICD-10-CM

## 2023-11-10 DIAGNOSIS — Z95.1 PRESENCE OF AORTOCORONARY BYPASS GRAFT: Chronic | ICD-10-CM

## 2023-12-02 NOTE — ASU DISCHARGE PLAN (ADULT/PEDIATRIC) - CALL YOUR DOCTOR IF YOU HAVE ANY OF THE FOLLOWING:
Bleeding that does not stop/Swelling that gets worse/Pain not relieved by Medications/Wound/Surgical Site with redness, or foul smelling discharge or pus/Numbness, tingling, color or temperature change to extremity/Nausea and vomiting that does not stop/Unable to urinate/Excessive diarrhea/Inability to tolerate liquids or foods/Increased irritability or sluggishness
No

## 2023-12-13 ENCOUNTER — APPOINTMENT (OUTPATIENT)
Dept: HEMATOLOGY ONCOLOGY | Facility: CLINIC | Age: 73
End: 2023-12-13

## 2023-12-22 ENCOUNTER — APPOINTMENT (OUTPATIENT)
Dept: OPHTHALMOLOGY | Facility: CLINIC | Age: 73
End: 2023-12-22

## 2023-12-26 ENCOUNTER — APPOINTMENT (OUTPATIENT)
Dept: OPHTHALMOLOGY | Facility: CLINIC | Age: 73
End: 2023-12-26

## 2024-01-08 ENCOUNTER — APPOINTMENT (OUTPATIENT)
Dept: OPHTHALMOLOGY | Facility: CLINIC | Age: 74
End: 2024-01-08
Payer: MEDICARE

## 2024-01-08 ENCOUNTER — NON-APPOINTMENT (OUTPATIENT)
Age: 74
End: 2024-01-08

## 2024-01-08 PROCEDURE — 92025 CPTRIZED CORNEAL TOPOGRAPHY: CPT

## 2024-01-08 PROCEDURE — 92004 COMPRE OPH EXAM NEW PT 1/>: CPT

## 2024-01-08 PROCEDURE — 92285 EXTERNAL OCULAR PHOTOGRAPHY: CPT

## 2024-01-11 ENCOUNTER — OUTPATIENT (OUTPATIENT)
Dept: OUTPATIENT SERVICES | Facility: HOSPITAL | Age: 74
LOS: 1 days | Discharge: HOME | End: 2024-01-11

## 2024-01-11 DIAGNOSIS — C43.30 MALIGNANT MELANOMA OF UNSPECIFIED PART OF FACE: ICD-10-CM

## 2024-01-11 DIAGNOSIS — Z95.5 PRESENCE OF CORONARY ANGIOPLASTY IMPLANT AND GRAFT: Chronic | ICD-10-CM

## 2024-01-11 DIAGNOSIS — Z98.1 ARTHRODESIS STATUS: Chronic | ICD-10-CM

## 2024-01-11 DIAGNOSIS — Z98.890 OTHER SPECIFIED POSTPROCEDURAL STATES: Chronic | ICD-10-CM

## 2024-01-11 DIAGNOSIS — Z98.2 PRESENCE OF CEREBROSPINAL FLUID DRAINAGE DEVICE: Chronic | ICD-10-CM

## 2024-01-11 DIAGNOSIS — Z95.1 PRESENCE OF AORTOCORONARY BYPASS GRAFT: Chronic | ICD-10-CM

## 2024-01-17 ENCOUNTER — APPOINTMENT (OUTPATIENT)
Dept: HEMATOLOGY ONCOLOGY | Facility: CLINIC | Age: 74
End: 2024-01-17
Payer: MEDICARE

## 2024-01-17 VITALS
SYSTOLIC BLOOD PRESSURE: 159 MMHG | DIASTOLIC BLOOD PRESSURE: 70 MMHG | HEART RATE: 60 BPM | HEIGHT: 67 IN | TEMPERATURE: 97.2 F | WEIGHT: 150.22 LBS | OXYGEN SATURATION: 98 % | BODY MASS INDEX: 23.58 KG/M2 | RESPIRATION RATE: 16 BRPM

## 2024-01-17 DIAGNOSIS — C43.59 MALIGNANT MELANOMA OF OTHER PART OF TRUNK: ICD-10-CM

## 2024-01-17 DIAGNOSIS — I25.10 ATHEROSCLEROTIC HEART DISEASE OF NATIVE CORONARY ARTERY W/OUT ANGINA PECTORIS: ICD-10-CM

## 2024-01-17 DIAGNOSIS — G91.2 (IDIOPATHIC) NORMAL PRESSURE HYDROCEPHALUS: ICD-10-CM

## 2024-01-17 DIAGNOSIS — I10 ESSENTIAL (PRIMARY) HYPERTENSION: ICD-10-CM

## 2024-01-17 DIAGNOSIS — R73.09 OTHER ABNORMAL GLUCOSE: ICD-10-CM

## 2024-01-17 PROCEDURE — 99205 OFFICE O/P NEW HI 60 MIN: CPT

## 2024-01-17 NOTE — ASSESSMENT
[Palliative Care Plan] : not applicable at this time [FreeTextEntry1] : Gabbi Vargas is a 73-year-old male with a past medical history of multiple medical illness; He has stage 3 a (N1a) melanoma and had a LN resection 09/10/2023 that showed Melan A positive cells. Although he has stage 3 disease (pT2 N1a) the cellular positivity of the cells in the absence of a lack of a melanoma nodule of at least 1 mm, place him at a low risk of recurrence. Review of data of stage 3 disease relapse free survival post-surgery has shown that as a group, patients who are special stain positive (few cells) in absence of nodules (at least 0.5 mm) have a 90-95 % 5-year term survival.  Younger and more medically fit patients may elect to receive immune therapy but given his past medical history and the duration since resection (now 4 months) we may elect to follow him off therapy. He never had a CT/PET scan as it was awaiting financial authorization. I will request a CT scan of chest abdomen and pelvis ath the six month interval (April 2024. Note prior CNS disease with norm pressure hydrocephalus and three neurosurgery procedures (shunts performed at [facility])

## 2024-01-17 NOTE — REVIEW OF SYSTEMS
[Diarrhea: Grade 0] : Diarrhea: Grade 0 [Joint Pain] : joint pain [Skin Rash] : no skin rash [Skin Wound] : no skin wound [Dizziness] : dizziness [Difficulty Walking] : difficulty walking [Negative] : Allergic/Immunologic [FreeTextEntry5] : history of coronary disease [de-identified] : prior normopressure hydrocephalus

## 2024-01-17 NOTE — REASON FOR VISIT
[Initial Consultation] : an initial consultation [Spouse] : spouse [Other: _____] : [unfilled] [FreeTextEntry2] : melanoma

## 2024-01-17 NOTE — HISTORY OF PRESENT ILLNESS
[Disease: _____________________] : Disease: [unfilled] [T: ___] : T[unfilled] [N: ___] : N[unfilled] [M: ___] : M[unfilled] [Date: ____________] : Patient's last distress assessment performed on [unfilled]. [0 - No Distress] : Distress Level: 0 [100: Normal, no complaints, no evidence of disease.] : 100: Normal, no complaints, no evidence of disease. [ECOG Performance Status: 0 - Fully active, able to carry on all pre-disease performance without restriction] : Performance Status: 0 - Fully active, able to carry on all pre-disease performance without restriction [de-identified] : melanoma [FreeTextEntry1] : observation [de-identified] : see hematology HPI first visit; he feels well,and he has not noted any new pigmented skin lesions. He continues to have follow up with is primary dermatologist. [de-identified] : 72-year-old man.\par  \par  Referred by dermatology (Dr. Guru SILVERMAN).\par  \par  Newly diagnosed >0.5 mm melanoma of his UPPER RIGHT BACK.\par  \par  This was an asymptomatic pigmented lesion of unclear duration identified on annual skin examination.\par  \par  \par  + Prior personal history:\par  ~2010: Mohs procedure on his right cheek by Dr. Marc Silverman.\par  No other skin cancers.\par  \par  No other personal history of malignancy.\par  \par  \par  + FH:\par  Father: "Skin cancer".\par  Mother: Uterine cancer.\par  \par  \par  Derm: Dr. Guru SILVERMAN.\par  \par  \par  PMD: Dr. Soy GUZMAN.\par  \par  \par  NKDA\par  \par  \par  No pacemaker or defibrillator.\par  No anticoagulants.\par  \par  +81 mg aspirin daily.\par  \par  + Hypertension.\par  He takes losartan and metoprolol.\par  \par  Cardiology: Dr. Edwardo LOUIE.\par  \par  2015: CABG x4 at [place].\par  2018: PTCA with stent placement at [facility] in [place].\par  \par  October 2022 he presented to [facility] with memory problems and difficulty walking.\par  He was diagnosed with encephalitis.\par  He had placement of a ventriculoperitoneal shunt, "embolization", and a transient drain.\par  Neurosurgery: Dr. Nahum ROMANO.\par  \par  + Elevated hemoglobin A1c.\par  He takes daily Invokana.\par  Endocrinology: Dr. Crow STEVEN\par  \par  + Hypercholesterolemia.\par  He takes Zocor, and Vascepa.\par  \par  + GERD.\par  Treated with omeprazole and Prilosec.\par  \par  + BPH.\par  Treated with saw palmetto caps.\par  Urology: Dr. Bharat MIRANDA\par  \par  + History of lumbar disc disease.\par  Orthopedics: Dr. Dakotah MENA.\par  Medications include diclofenac, glucosamine, and Advil.\par  \par  + Scoliosis.\par  In the past he has seen Dr. Howard Daniels (spine/Ortho).\par  \par  He takes Viibryd.\par  Used to see Dr. Medina.\par  Now renewed by his internist.\par  \par  \par  December 2022 eye examination with Dr. Lionel GOODWIN identified no worrisome findings.\par  \par  \par  2018 colonoscopy with GI (Dr. Prabhjot THEODORE) was okay.\par  I suggested a follow-up visit.

## 2024-01-17 NOTE — PHYSICAL EXAM
[Restricted in physically strenuous activity but ambulatory and able to carry out work of a light or sedentary nature] : Status 1- Restricted in physically strenuous activity but ambulatory and able to carry out work of a light or sedentary nature, e.g., light house work, office work [Normal] : affect appropriate [de-identified] : mid sternal scar [de-identified] : well healed scar right axilla and right mid back

## 2024-02-18 ENCOUNTER — NON-APPOINTMENT (OUTPATIENT)
Age: 74
End: 2024-02-18

## 2024-03-08 ENCOUNTER — APPOINTMENT (OUTPATIENT)
Dept: OPHTHALMOLOGY | Facility: CLINIC | Age: 74
End: 2024-03-08
Payer: MEDICARE

## 2024-03-08 ENCOUNTER — NON-APPOINTMENT (OUTPATIENT)
Age: 74
End: 2024-03-08

## 2024-03-08 PROCEDURE — 92012 INTRM OPH EXAM EST PATIENT: CPT

## 2024-03-08 PROCEDURE — 95060 OPH MUCOUS MEMBRANE TESTS: CPT

## 2024-04-04 ENCOUNTER — APPOINTMENT (OUTPATIENT)
Dept: ORTHOPEDIC SURGERY | Facility: CLINIC | Age: 74
End: 2024-04-04
Payer: MEDICARE

## 2024-04-04 VITALS — HEIGHT: 67 IN | BODY MASS INDEX: 23.54 KG/M2 | WEIGHT: 150 LBS

## 2024-04-04 DIAGNOSIS — M48.02 SPINAL STENOSIS, CERVICAL REGION: ICD-10-CM

## 2024-04-04 DIAGNOSIS — M19.012 PRIMARY OSTEOARTHRITIS, LEFT SHOULDER: ICD-10-CM

## 2024-04-04 DIAGNOSIS — M19.011 PRIMARY OSTEOARTHRITIS, RIGHT SHOULDER: ICD-10-CM

## 2024-04-04 DIAGNOSIS — R26.89 OTHER ABNORMALITIES OF GAIT AND MOBILITY: ICD-10-CM

## 2024-04-04 PROCEDURE — 99214 OFFICE O/P EST MOD 30 MIN: CPT | Mod: 25

## 2024-04-04 PROCEDURE — 73030 X-RAY EXAM OF SHOULDER: CPT | Mod: 50

## 2024-04-04 PROCEDURE — 20611 DRAIN/INJ JOINT/BURSA W/US: CPT | Mod: LT

## 2024-04-04 NOTE — DISCUSSION/SUMMARY
[de-identified] : I went over the pathophysiology of the patient's symptoms in great detail with the patient. I discussed the underlying pathophysiology of the patient's condition in great detail with the patient. I went over the patient's x-rays with them in great detail. Visco supplementation was discussed as a solution to the patient's symptoms and a booklet with information was provided. The patient elected to receive a cortisone injection into his left shoulder today, and tolerated it well. I instructed the patient on ROM exercises, and told them to take it easy. The use of ice and rest was reviewed with the patient. The patient may resume activities tomorrow. At this time, he will start a course of physical therapy for strengthening and flexibility. A prescription was provided.   All of their questions were answered. They understand and consent to the plan.   FU in a couple weeks for a left shoulder gel injections.

## 2024-04-04 NOTE — PHYSICAL EXAM
[de-identified] : Constitutional o Appearance : well-nourished, well developed, alert, in no acute distress  Head and Face o Head :  Inspection : atraumatic, normocephalic o Face :  Inspection : no visible rash or discoloration Respiratory o Respiratory Effort: breathing unlabored  Neurologic o Sensation : Normal sensation  Psychiatric o Mood and Affect: mood normal, affect appropriate  Lymphatic o Additional Nodes : No palpable lymph nodes present   Cervical Spine o Inspection/Palpation :  Inspection : alignment midline, normal degree of lordosis present  Skin : normal appearance, no masses or tenderness, trachea midline  Palpation : musculature is nontender to palpation o Range of Motion : arc of motion full in all planes, no crepitance or pain with ROM o Tests: Negative Spurlings test  Right Upper Extremity o Right Shoulder :  Inspection/Palpation : no tenderness, no swelling or deformities  Range of Motion : forward flexion to 120, IR is limited but bilaterally symmetrical  no crepitance  Strength : forward elevation 5/5, IR 5/5, ER 5/5, ER at 90 of abduction 5/5, supraspinatus 5/5, adduction 5/5, abduction 5/5, biceps/triceps 5/5,  5/5  Stability : no joint instability on provocative testing   Tests: Beyer negative, Neer negative, Allison negative, drop arm test negative, [place]'s test negative  Left Upper Extremity o Left Shoulder :  Inspection/Palpation : mild anterior capsular tenderness, no swelling or deformities  Range of Motion :  forward flexion to 120, limited IR to the posterior iliac spine, limited ER, abduction is 90 degrees,  no crepitance  Strength : forward elevation 4+/5, IR 5/5, ER 5/5, ER at 90 of abduction 4-/5, supraspinatus 4+/5, adduction 5/5, abduction 5/5, biceps/triceps 5/5,  5/5  Stability : no joint instability on provocative testing   Tests: Beyer negative, Neer negative, Allison negative, drop arm test negative, [place]'s test negative  Gait and Station: Gait: gait normal, no significant extremity swelling or lymphedema, good proprioception and balance  Radiology Results 4/4/2024 o Right Shoulder : AP internal/external rotation and outlet views were obtained and revealed 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 The patient tolerated the procedure well. This was done under Ultrasound guidance.

## 2024-04-04 NOTE — ADDENDUM
[FreeTextEntry1] : I, OMERO DAMON, acted solely as a scribe for Dr. Dakotah Swann on this date 04/04/2024.  All medical record entries made by the Scribe were at my, Dr. Dakotah Swann, direction and personally dictated by me on 04/04/2024. I have reviewed the chart and agree that the record accurately reflects my personal performance of the history, physical exam, assessment and plan. I have also personally directed, reviewed, and agreed with the chart.

## 2024-04-04 NOTE — HISTORY OF PRESENT ILLNESS
[de-identified] : 73 year old RHD male presents complaining of left shoulder pain that started 1 week ago. He notes that he lifted a metal chair and felt something pull of the left shoulder. he has had shoulder pain since this incident. He presents with his wife. She states he lost all muscle tone. She states he had 3 brain surgeries due to normal pressure hydrocephalus. He states is balance is worse than before, now needs to use a walker. Patient denies that he has any numbness or tingling down his arms or loss of significant dexterity he is known to have severe cervical stenosis.

## 2024-04-06 ENCOUNTER — NON-APPOINTMENT (OUTPATIENT)
Age: 74
End: 2024-04-06

## 2024-04-09 ENCOUNTER — OUTPATIENT (OUTPATIENT)
Dept: OUTPATIENT SERVICES | Facility: HOSPITAL | Age: 74
LOS: 1 days | Discharge: HOME | End: 2024-04-09

## 2024-04-09 DIAGNOSIS — Z95.5 PRESENCE OF CORONARY ANGIOPLASTY IMPLANT AND GRAFT: Chronic | ICD-10-CM

## 2024-04-09 DIAGNOSIS — Z95.1 PRESENCE OF AORTOCORONARY BYPASS GRAFT: Chronic | ICD-10-CM

## 2024-04-09 DIAGNOSIS — Z98.890 OTHER SPECIFIED POSTPROCEDURAL STATES: Chronic | ICD-10-CM

## 2024-04-09 DIAGNOSIS — C43.30 MALIGNANT MELANOMA OF UNSPECIFIED PART OF FACE: ICD-10-CM

## 2024-04-09 DIAGNOSIS — Z98.2 PRESENCE OF CEREBROSPINAL FLUID DRAINAGE DEVICE: Chronic | ICD-10-CM

## 2024-04-09 DIAGNOSIS — Z98.1 ARTHRODESIS STATUS: Chronic | ICD-10-CM

## 2024-04-17 ENCOUNTER — APPOINTMENT (OUTPATIENT)
Dept: HEMATOLOGY ONCOLOGY | Facility: CLINIC | Age: 74
End: 2024-04-17

## 2024-05-07 ENCOUNTER — APPOINTMENT (OUTPATIENT)
Dept: ORTHOPEDIC SURGERY | Facility: CLINIC | Age: 74
End: 2024-05-07

## 2024-08-27 ENCOUNTER — APPOINTMENT (OUTPATIENT)
Dept: OPHTHALMOLOGY | Facility: CLINIC | Age: 74
End: 2024-08-27

## 2024-08-27 PROCEDURE — 92025 CPTRIZED CORNEAL TOPOGRAPHY: CPT

## 2024-08-27 PROCEDURE — 92012 INTRM OPH EXAM EST PATIENT: CPT

## 2024-09-03 ENCOUNTER — EMERGENCY (EMERGENCY)
Facility: HOSPITAL | Age: 74
LOS: 1 days | Discharge: ANOTHER HOSPITAL | End: 2024-09-03
Attending: EMERGENCY MEDICINE | Admitting: EMERGENCY MEDICINE
Payer: MEDICARE

## 2024-09-03 VITALS
HEART RATE: 68 BPM | SYSTOLIC BLOOD PRESSURE: 143 MMHG | RESPIRATION RATE: 16 BRPM | TEMPERATURE: 98 F | DIASTOLIC BLOOD PRESSURE: 75 MMHG | OXYGEN SATURATION: 96 % | HEIGHT: 67 IN

## 2024-09-03 VITALS
SYSTOLIC BLOOD PRESSURE: 168 MMHG | HEART RATE: 70 BPM | DIASTOLIC BLOOD PRESSURE: 70 MMHG | TEMPERATURE: 99 F | OXYGEN SATURATION: 97 % | RESPIRATION RATE: 18 BRPM

## 2024-09-03 DIAGNOSIS — Z95.1 PRESENCE OF AORTOCORONARY BYPASS GRAFT: Chronic | ICD-10-CM

## 2024-09-03 DIAGNOSIS — Z98.890 OTHER SPECIFIED POSTPROCEDURAL STATES: Chronic | ICD-10-CM

## 2024-09-03 DIAGNOSIS — Z98.1 ARTHRODESIS STATUS: Chronic | ICD-10-CM

## 2024-09-03 DIAGNOSIS — Z95.5 PRESENCE OF CORONARY ANGIOPLASTY IMPLANT AND GRAFT: Chronic | ICD-10-CM

## 2024-09-03 DIAGNOSIS — Z98.2 PRESENCE OF CEREBROSPINAL FLUID DRAINAGE DEVICE: Chronic | ICD-10-CM

## 2024-09-03 LAB
ALBUMIN SERPL ELPH-MCNC: 3.8 G/DL — SIGNIFICANT CHANGE UP (ref 3.3–5)
ALP SERPL-CCNC: 57 U/L — SIGNIFICANT CHANGE UP (ref 40–120)
ALT FLD-CCNC: 23 U/L — SIGNIFICANT CHANGE UP (ref 12–78)
ANION GAP SERPL CALC-SCNC: 5 MMOL/L — SIGNIFICANT CHANGE UP (ref 5–17)
APTT BLD: 23.9 SEC — LOW (ref 24.5–35.6)
AST SERPL-CCNC: 13 U/L — LOW (ref 15–37)
BASOPHILS # BLD AUTO: 0.02 K/UL — SIGNIFICANT CHANGE UP (ref 0–0.2)
BASOPHILS NFR BLD AUTO: 0.3 % — SIGNIFICANT CHANGE UP (ref 0–2)
BILIRUB SERPL-MCNC: 0.6 MG/DL — SIGNIFICANT CHANGE UP (ref 0.2–1.2)
BUN SERPL-MCNC: 19 MG/DL — SIGNIFICANT CHANGE UP (ref 7–23)
CALCIUM SERPL-MCNC: 9 MG/DL — SIGNIFICANT CHANGE UP (ref 8.5–10.1)
CHLORIDE SERPL-SCNC: 104 MMOL/L — SIGNIFICANT CHANGE UP (ref 96–108)
CO2 SERPL-SCNC: 27 MMOL/L — SIGNIFICANT CHANGE UP (ref 22–31)
CREAT SERPL-MCNC: 0.88 MG/DL — SIGNIFICANT CHANGE UP (ref 0.5–1.3)
EGFR: 90 ML/MIN/1.73M2 — SIGNIFICANT CHANGE UP
EOSINOPHIL # BLD AUTO: 0.11 K/UL — SIGNIFICANT CHANGE UP (ref 0–0.5)
EOSINOPHIL NFR BLD AUTO: 1.7 % — SIGNIFICANT CHANGE UP (ref 0–6)
GLUCOSE SERPL-MCNC: 174 MG/DL — HIGH (ref 70–99)
HCT VFR BLD CALC: 41.6 % — SIGNIFICANT CHANGE UP (ref 39–50)
HGB BLD-MCNC: 13.4 G/DL — SIGNIFICANT CHANGE UP (ref 13–17)
IMM GRANULOCYTES NFR BLD AUTO: 0.3 % — SIGNIFICANT CHANGE UP (ref 0–0.9)
INR BLD: 1.01 RATIO — SIGNIFICANT CHANGE UP (ref 0.85–1.18)
LYMPHOCYTES # BLD AUTO: 1.57 K/UL — SIGNIFICANT CHANGE UP (ref 1–3.3)
LYMPHOCYTES # BLD AUTO: 24.3 % — SIGNIFICANT CHANGE UP (ref 13–44)
MCHC RBC-ENTMCNC: 30 PG — SIGNIFICANT CHANGE UP (ref 27–34)
MCHC RBC-ENTMCNC: 32.2 GM/DL — SIGNIFICANT CHANGE UP (ref 32–36)
MCV RBC AUTO: 93.1 FL — SIGNIFICANT CHANGE UP (ref 80–100)
MONOCYTES # BLD AUTO: 0.71 K/UL — SIGNIFICANT CHANGE UP (ref 0–0.9)
MONOCYTES NFR BLD AUTO: 11 % — SIGNIFICANT CHANGE UP (ref 2–14)
NEUTROPHILS # BLD AUTO: 4.02 K/UL — SIGNIFICANT CHANGE UP (ref 1.8–7.4)
NEUTROPHILS NFR BLD AUTO: 62.4 % — SIGNIFICANT CHANGE UP (ref 43–77)
NRBC # BLD: 0 /100 WBCS — SIGNIFICANT CHANGE UP (ref 0–0)
PLATELET # BLD AUTO: 211 K/UL — SIGNIFICANT CHANGE UP (ref 150–400)
POTASSIUM SERPL-MCNC: 3.8 MMOL/L — SIGNIFICANT CHANGE UP (ref 3.5–5.3)
POTASSIUM SERPL-SCNC: 3.8 MMOL/L — SIGNIFICANT CHANGE UP (ref 3.5–5.3)
PROT SERPL-MCNC: 6.7 G/DL — SIGNIFICANT CHANGE UP (ref 6–8.3)
PROTHROM AB SERPL-ACNC: 11.5 SEC — SIGNIFICANT CHANGE UP (ref 9.5–13)
RBC # BLD: 4.47 M/UL — SIGNIFICANT CHANGE UP (ref 4.2–5.8)
RBC # FLD: 13.5 % — SIGNIFICANT CHANGE UP (ref 10.3–14.5)
SODIUM SERPL-SCNC: 136 MMOL/L — SIGNIFICANT CHANGE UP (ref 135–145)
TROPONIN I, HIGH SENSITIVITY RESULT: 8.5 NG/L — SIGNIFICANT CHANGE UP
WBC # BLD: 6.45 K/UL — SIGNIFICANT CHANGE UP (ref 3.8–10.5)
WBC # FLD AUTO: 6.45 K/UL — SIGNIFICANT CHANGE UP (ref 3.8–10.5)

## 2024-09-03 PROCEDURE — 70496 CT ANGIOGRAPHY HEAD: CPT | Mod: MC

## 2024-09-03 PROCEDURE — 0042T: CPT

## 2024-09-03 PROCEDURE — 0042T: CPT | Mod: MC

## 2024-09-03 PROCEDURE — 93010 ELECTROCARDIOGRAM REPORT: CPT

## 2024-09-03 PROCEDURE — 70498 CT ANGIOGRAPHY NECK: CPT | Mod: 26,MC

## 2024-09-03 PROCEDURE — 70450 CT HEAD/BRAIN W/O DYE: CPT | Mod: XU,MC

## 2024-09-03 PROCEDURE — 36415 COLL VENOUS BLD VENIPUNCTURE: CPT

## 2024-09-03 PROCEDURE — 70498 CT ANGIOGRAPHY NECK: CPT | Mod: MC

## 2024-09-03 PROCEDURE — 80053 COMPREHEN METABOLIC PANEL: CPT

## 2024-09-03 PROCEDURE — 82962 GLUCOSE BLOOD TEST: CPT

## 2024-09-03 PROCEDURE — 99291 CRITICAL CARE FIRST HOUR: CPT

## 2024-09-03 PROCEDURE — 70496 CT ANGIOGRAPHY HEAD: CPT | Mod: 26,MC

## 2024-09-03 PROCEDURE — 71045 X-RAY EXAM CHEST 1 VIEW: CPT | Mod: 26

## 2024-09-03 PROCEDURE — 85025 COMPLETE CBC W/AUTO DIFF WBC: CPT

## 2024-09-03 PROCEDURE — 71045 X-RAY EXAM CHEST 1 VIEW: CPT

## 2024-09-03 PROCEDURE — 84484 ASSAY OF TROPONIN QUANT: CPT

## 2024-09-03 PROCEDURE — 85730 THROMBOPLASTIN TIME PARTIAL: CPT

## 2024-09-03 PROCEDURE — 99291 CRITICAL CARE FIRST HOUR: CPT | Mod: 25

## 2024-09-03 PROCEDURE — 85610 PROTHROMBIN TIME: CPT

## 2024-09-03 PROCEDURE — 93005 ELECTROCARDIOGRAM TRACING: CPT

## 2024-09-03 RX ORDER — CANAGLIFLOZIN 100 MG/1
1 TABLET, FILM COATED ORAL
Refills: 0 | DISCHARGE

## 2024-09-03 RX ORDER — COLESTIPOL HCL 5 G
2 GRANULES (GRAM) ORAL
Refills: 0 | DISCHARGE

## 2024-09-03 RX ORDER — TAMSULOSIN HYDROCHLORIDE 0.4 MG/1
1 CAPSULE ORAL
Refills: 0 | DISCHARGE

## 2024-09-03 RX ORDER — ASPIRIN 81 MG
325 TABLET, DELAYED RELEASE (ENTERIC COATED) ORAL ONCE
Refills: 0 | Status: COMPLETED | OUTPATIENT
Start: 2024-09-03 | End: 2024-09-03

## 2024-09-03 RX ADMIN — Medication 325 MILLIGRAM(S): at 16:55

## 2024-09-03 NOTE — ED ADULT TRIAGE NOTE - HEIGHT IN CM
Renal Medicine Progress Note            Assessment/Plan:     # CKD 4-baseline creatinine of 2.2-2.6, minimally proteinuric.  Presumed secondary to hypertension/diabetes mellitus/advanced age.               -she is agreeable to dialysis if needed     # Dyspnea-recent pneumonia with large bilateral pleural effusion.  Last echo from January with preserved ejection fraction and some suggestion of diastolic dysfunction.               -improved with diuresis and thoracentesis     # Acute kidney injury-creatinine was 1.9 on discharge on 12/14 and up to 4.3 on 12/22.  It did not improve despite stopping torsemide.  Possible that she may have sustained ischemic tubular injury out-of-hospital and creatinine has plateaued now.  Low urine sodium and fractional excretion of sodium suggest possible cardiorenal physiology.  Echocardiogram shows preserved ejection fraction, grade 1 diastolic dysfunction and pulmonary hypertension.               -she seems to be on the dry side    -note getting better     # Bilateral pleural effusions-transudate A.  Status post thoracentesis.     # Hypertension: DBP is quite low?     # Type 2 diabetes mellitus-management per primary team.     # Anemia chronic kidney disease-iron sat  Only 10% on 12/22.  Ferritin low at 54. SPEP negative.    -had 5 doses of IV FE     # Hyperphosphatemia-with renal failure.  Phos still > 6.  Phoslo to 2 TID.       Plan:  # Cont to hold diuretics  # DBP is quite low. BP medications have been adjusted  # Okay with liberalizing fluid to 2000 ml daily  # Renal panel in AM, BNP and CXR tomorrow   # No urgent indication for RRT          Interval History:     Afebrile. Her DBP is that low? She sitting in the chair and watching the news. She says she feels tired but better today. No worsening shortness of breath, nausea or vomiting. She is still on 6 liters NS          Medications and Allergies:       calcium acetate  1,334 mg Oral TID w/meals     diltiazem ER COATED BEADS   "180 mg Oral BID     doxazosin  1 mg Oral At Bedtime     gabapentin  100 mg Oral Daily     hydrALAZINE  25 mg Oral TID     insulin aspart  1-7 Units Subcutaneous TID AC     insulin aspart  1-5 Units Subcutaneous At Bedtime     senna-docusate  1 tablet Oral BID    Or     senna-docusate  2 tablet Oral BID     sertraline  50 mg Oral Daily     simvastatin  10 mg Oral At Bedtime     sodium chloride (PF)  3 mL Intracatheter Q8H        Allergies   Allergen Reactions     Augmentin Diarrhea     Vomiting       Cleocin      Hives     Tegretol [Carbamazepine]      Irregular heart beat            Physical Exam:   Vitals were reviewed   , Blood pressure (!) 127/32, pulse 66, temperature 97.6  F (36.4  C), temperature source Axillary, resp. rate 20, height 1.626 m (5' 4\"), weight 101.5 kg (223 lb 11.2 oz), SpO2 94 %, not currently breastfeeding.    Wt Readings from Last 3 Encounters:   01/06/21 101.5 kg (223 lb 11.2 oz)   12/22/20 101.1 kg (222 lb 14.4 oz)   12/14/20 96.7 kg (213 lb 1.6 oz)       Intake/Output Summary (Last 24 hours) at 1/6/2021 1313  Last data filed at 1/6/2021 0933  Gross per 24 hour   Intake 480 ml   Output 475 ml   Net 5 ml     GENERAL APPEARANCE: pleasant, NAD, alert  HEENT:  Eyes/ears/nose/neck grossly normal  RESP: Diminish BS. No wheezes.   CV: RRR, nl S1/S2  ABDOMEN: obese, soft, NT  EXTREMITIES/SKIN: no rashes/lesions on observed skin; No edema  NEURO: a/o x 3. Grossly intact.          Data:     CBC RESULTS:     Recent Labs   Lab 01/02/21  0714   HGB 8.4*       Basic Metabolic Panel:  Recent Labs   Lab 01/06/21  0658 01/05/21  0652 01/04/21  0657 01/03/21  0719 01/02/21  0714 01/01/21  0805    139 139 138 138 138   POTASSIUM 3.4 3.8 3.4 3.6 3.5 3.4   CHLORIDE 100 100 101 102 102 102   CO2 30 33* 33* 34* 30 28   * 114* 101* 95* 88* 84*   CR 4.15* 4.20* 4.00* 3.90* 3.96* 4.08*   * 163* 163* 134* 161* 167*   EDNA 8.9 9.1 9.1 8.9 8.7 9.0       INRNo lab results found in last 7 days. "   Attestation:   I have reviewed today's relevant vital signs, notes, medications, labs and imaging.    Olvin Escoto MD  [facility] - Nephrology  Office phone :[phone]  Pager: [phone]   170.18

## 2024-09-03 NOTE — ED PROVIDER NOTE - NIH STROKE SCALE DATE
03-Sep-2024 15:13 Occupational Therapy    Visit Type: treatment  Born at Gestational Age: 33w0d and now corrected gestational age 43w 3d    Nursing comments: RN approved OT treatment.   Present at bedside: Nurse  Precautions: cardiac and swallow/dysphagia    SUBJECTIVE  No caregivers present.     Pain   RN completed pain assessment during care time    OBJECTIVE    Autonomic Stability:    Heart Rate: stable    Respiratory: stable    Oxygen Saturations: stable and intermittent    Comments/Details: Intermittent desats; improves once swaddled    Bed Type: open crib  Photo Therapy: no  Respiratory Support: ventilator  Current Positioning Device: Zflo, small, Swaddle and Frog    State: light sleep, eyes shut, some movement, crying and dozing, eyes opening/closing    Neurobehavioral:    State regulation: abrupt transitions    Tolerance to sensory input: exaggerated response    Visual response: conjugate gaze    Touch response: adapts with containment/therapeutic touch    Vestibular processing: adapts with containment/support    Infant stress cues:      -motor: poor quality of movement      -behavioral: crying/fussing, grimace and hypersensitive    Irritability: cries to 1-2 stimuli    Consolability: consoled by sustained/containment touch, consoled by swaddling and requires extensive/continuous intervention    Self regulation:       -achieved with assistance of: containment, boundaries, therapeutic pauses, swaddle and non-nutritive suck on pacifier      Range of Motion (noted in % unless otherwise indicated, active reported unless noted):  Comments / Details: PROM of L UE WFL  PROM R UE lacking ~20* extension.   Limited assessment 2/2 state regulation today.          Fine Motor:   Grasp reflex present bilaterally      Interventions    Sensory/Vision:  -Sensory: containment, calming measures, proprioceptive input, sensory input and tactile input    External Devices: swaddling and modify environment    Interventions: reinforce postural  symmetry    Skilled input: Verbal instruction/cues    Education Completed    Person instructed: RN    Education completed: current neuromotor and neurobehavioral status of infant and positioning of infant    Teaching method: verbal instruction/explanation    Response to education: Verbalizes understanding    Provided education on benefits of low stim environment            ASSESSMENT    - Impairments: movement quality, state regulation, edema, range of motion, tolerance to positioning and tolerance to sensory stimulation  - Functional Limitations: sleep/wake cycle, caregiver bonding, self regulation, engagement, functional mobility and participating in meaningful/purposeful activities  - Personal Occupations Profile Affected: functional mobility/transfers, sleep participation, social participation family     Infant seen for OT tx session during RN assessment. Infant born 33.0 weeks gestation and is now 43.3  weeks PMA. 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    Infant received intubated, swaddled in R sidelying position with sound machine playing. Infant transitions between fussy and drowsy states with intermittent conjugate gazes toward RN observed when in calm state. Overall, he benefits from containment, slow paced movement, and low stim environment to support a calm state today. With slow paced unswaddling, he demonstrates weak active movement of BUEs and is sensitive to touch on his extremities. He appears to be lacking extension in RUE, limited assessment due to state today. Recommend ongoing OT 2x/week to progress state regulation and provide ongoing education to RN.      Recommendations: Low stim environment (adding scheduled natural light during the day), slow paced movement, and ongoing use of developmental supports.    Discharge Recommendations:  OT Referrals/Discharge Recommendations: Refer to specialty clinic  Skilled therapy is required  to address these limitations in attempt to maximize the patient's independence.  - Progress: progressing toward goals  - Clinical decision making: Complex/High - Patient has several limitations (5+), comorbidities and/or complexities, as noted in comprehensive assessment above, that impact their occupational profile.  Resulting in multiple treatment options and significant task modification consistent with high clinical decision making complexity.    End of Session:  Location: radiant warmer  Safety measures: equipment intact and lines intact  Handoff to: nurse    PLAN  Suggestions for next session as indicated: OT Frequency: Once a week,2 days/week            GOALS    Long Term Goals: (to be met by time of discharge from hospital)    1. Caregiver education on ways to provide neuroprotection to support state regulation and protect sleep.   2. Infant will transition to and sustain a calm/regulated state with supports as needed for at least 2-3 minutes to promote engagement with caregiver and state regulation.   3. Ongoing OT assessment.   Documented in the chart in the following areas: Pain. Assessment.      Therapy procedure time and total treatment time can be found documented on the Time Entry flowsheet

## 2024-09-03 NOTE — ED ADULT NURSE NOTE - CADM POA PRESS ULCER
8/25/2017      Joanne Ponce  [address] [place] [id number]            Dear Joanne:    We have been unable to reach you by phone.  Please contact the office at your earliest convenience to discuss some recent lab results, and to have you come in for another lab draw.      Sincerely,      Geraldine Olguin PA-C  [address]  [id number] [phone]     No

## 2024-09-03 NOTE — CHART NOTE - NSCHARTNOTEFT_GEN_A_CORE
<<< DRAFT / INCOMPLETE >>>    Gabbi Vargas  1950  [facility]  ED Provider - Dr. Saez    74-year-old female w/ PMHx CABG x 4, cardiac stents, HTN, on ASA QD, NPH (s/p VPS 2022), who p/w acute onset slurred speech, right facial droop, RUE numbness, and generally not feeling well. Per ED provider examination, patient awake, alert, answering questions appropriately, found to have mild R facial paresis, mild dysarthria, and RUE pronator drift (as well as mild vertical drift). Patient without symptoms affecting his lower extremities. Of note, patient with some vision loss restricted to his left eye, which is chronic.    CTH: no acute pathology; h/o embolization for a SDH.  CTA H/N:  CTP:     LKN: 14:00 PM 9/3/24       <<< DRAFT / INCOMPLETE >>> Gabbi Vargas  1950  [facility]  ED Provider - Dr. Saez    74-year-old female w/ PMHx CABG x 4, cardiac stents, HTN, on ASA QD, NPH (s/p VPS 2022), who p/w acute onset slurred speech, right facial droop, RUE numbness, and generally not feeling well. Per ED provider examination, patient awake, alert, answering questions appropriately, found to have mild R facial paresis, mild dysarthria, and RUE pronator drift (as well as mild vertical drift). Patient without symptoms affecting his lower extremities. Of note, patient with some vision loss restricted to his left eye, which is chronic.    CTH: no acute pathology; h/o MMA embolization for a R SDH.  CTA H/N, CTP: Right-sided  shunt for normal pressure hydrocephalus. No intracranial hemorrhage. Calcification along the tectal plate bilaterally. Anterior third ventricular colloid cyst. Remote right frontal craniotomy and right middle meningeal artery embolization. CTA of the head and neck demonstrates no large vessel occlusion. CT perfusion data not available due to computer error.    Imaging above reviewed with neuro-radiology.  On further discussion with the family's wife, sometime after COVID, patient was discovered to have both a right subdural hematoma as well as normal pressure hydrocephalus. In 2022, patient underwent simultaneous procedures for both R SDH (i.e., MMA embolization) as well as for NPH (i.e., VPS).     LKN: 14:00 PM 9/3/24  NIHSS: 7 (moderate R facial, mild-to-moderate dysarthria, RUE falls to bed w/ effort against gravity, b/l LE mild drifts)      Not a tenecteplase candidate due to history of past intracranial hemorrhage.  Not a mechanical thrombectomy candidate due to no LVO.    Impression: Acute onset right hemiparesis localizable to left brain dysfunction, likely due to acute ischemic stroke of unclear mechanism at this time due to incomplete workup.    Recommendations:    Diagnostics:  [] MRI brain without contrast   [] TTE w/o bubble   [] Lipid panel, HbA1c  [] CBC, CMP, coagulation panel, troponin    Medications:  [] Monotherapy with ASA 81 mg PO (325 per rectum if fails dysphagia)   [] Atorvastatin 80mg (titrate to LDL < 70)   [] Lovenox SQ and sequential compression device for DVT prophylaxis     Miscellaneous:  [] NPO unless passes dysphagia screen; swallow eval if fails  [] Keep BP permissive up to 220/120 for 48 hours followed by gradual normotension over 2-3 days   [] Telemonitoring  [] Q4H neurological checks and vital signs  [] BGM goals 140-180  [] PT/OT; S/S evaluation      Case and plan above discussed with [facility] stroke attending Dr. Richard Libman      -  Cory Teran MD  Neurovascular Fellow

## 2024-09-03 NOTE — ED PROVIDER NOTE - STUDIES
Dr Springer will discuss with patient at her appointment on Monday   EKG - see results section for interpretation/Xray Image(s) - see wet read section for interpretation

## 2024-09-03 NOTE — ED PROVIDER NOTE - CLINICAL SUMMARY MEDICAL DECISION MAKING FREE TEXT BOX
74-year-old male with right facial droop, slurred speech, right arm weakness, Follow-up CT head, CT angio head and neck, CT perfusion study, CBC, CMP, troponin, EKG, placed on cardiac monitor, pulse oximeter, obtain chest x-ray, EKG and call neurology

## 2024-09-03 NOTE — ED ADULT NURSE NOTE - ED STAT RN HANDOFF DETAILS
report given to Ros) triage RN in [facility] emergency Department.  patient safely transferred off unit to [facility] with family and EMS.

## 2024-09-03 NOTE — ED ADULT TRIAGE NOTE - BEFAST SCREENING
Empiric treatment for presumed leg cellulitis, ECHO notes diastolic dysfunction, low albumin likely third spacing and could be attributed to calcium channel blocker medication  Elevated legs  Strict I & O  SCDs  FADI hose     Positive

## 2024-09-03 NOTE — ED PROVIDER NOTE - PROGRESS NOTE DETAILS
called transfet center, stroke, attending Dr. Douglas Sharmaine Collins states he wants denita check with neurosurgery regarding ct head findings for embolization of subdural spoke with radiology Attending Dr. Joan Pedersen, states she will speak with Dr. Schmid regarding ct scan results, advised to hold TNK as per Dr. Cee delay in perfusion Study, will re-send images spoke with attending neurologist Dr. Denis, states the  shunt needs to be reprogrammed by neuro surgeon in order to get MRI and cannot be done at [facility], will need to tranferrred, family is requesting tranfer to [facility], spoke with neuro surgery Nahum Briggs. will be ER to ER no TNK to be given as patient had prior subdural bleed

## 2024-09-03 NOTE — ED ADULT TRIAGE NOTE - CHIEF COMPLAINT QUOTE
Patient is from a restaurant .having lunch at restaurant. Sudden onset of dizziness and feel weird.  C/O slurred speech. Patient taken to Ct scan immediately by EMT.

## 2024-09-03 NOTE — ED ADULT NURSE NOTE - NSFALLRISKINTERV_ED_ALL_ED
Assistance OOB with selected safe patient handling equipment if applicable/Assistance with ambulation/Communicate fall risk and risk factors to all staff, patient, and family/Monitor gait and stability/Monitor for mental status changes and reorient to person, place, and time, as needed/Move patient closer to nursing station/within visual sight of ED staff/Provide patient with walking aids/Provide visual cue: yellow wristband, yellow gown, etc/Reinforce activity limits and safety measures with patient and family/Toileting schedule using arm’s reach rule for commode and bathroom/Use of alarms - bed, stretcher, chair and/or video monitoring/Call bell, personal items and telephone in reach/Instruct patient to call for assistance before getting out of bed/chair/stretcher/Non-slip footwear applied when patient is off stretcher/[place] to call system/Physically safe environment - no spills, clutter or unnecessary equipment/Purposeful Proactive Rounding/Room/bathroom lighting operational, light cord in reach

## 2024-09-03 NOTE — ED ADULT NURSE NOTE - OBJECTIVE STATEMENT
patient presents to ED with patient presents to ED as Code Stroke.  patient was out to lunch with his wife.  patient walked to the bathroom with his walker and when he returned he had a right sided facial droop and right arm weakness.  code stroke called when patient arrived at hospital ( see stroke flow sheet )  patient was calm and cooperative when he arrived

## 2024-09-03 NOTE — ED PROVIDER NOTE - OBJECTIVE STATEMENT
74-year-old male PMH of CAD, CABG x 4 vessel, stent x 3, HTN, pre-DM, HLD, normal pressure hydrocephalus status post ventriculoperitoneal shunt in 2022, on baby aspirin, no anticoagulants, presenting to the emergency department by ambulance with report of code stroke.  Wife at the bedside states that at 2 PM patient experienced right arm weakness and noted right facial droop.

## 2024-09-09 NOTE — H&P PST ADULT - AS BP NONINV METHOD
electronic [No Acute Distress] : no acute distress [Normal Oropharynx] : normal oropharynx [Normal Appearance] : normal appearance [No Neck Mass] : no neck mass [Normal Rate/Rhythm] : normal rate/rhythm [Normal S1, S2] : normal s1, s2 [No Murmurs] : no murmurs [No Resp Distress] : no resp distress [Clear to Auscultation Bilaterally] : clear to auscultation bilaterally [Rales] : rales [No Abnormalities] : no abnormalities [Benign] : benign [Normal Gait] : normal gait [No Clubbing] : no clubbing [No Cyanosis] : no cyanosis [No Edema] : no edema [FROM] : FROM [Normal Color/ Pigmentation] : normal color/ pigmentation [No Focal Deficits] : no focal deficits [Oriented x3] : oriented x3 [Normal Affect] : normal affect

## 2025-02-11 ENCOUNTER — EMERGENCY (EMERGENCY)
Facility: HOSPITAL | Age: 75
LOS: 1 days | Discharge: HOME | End: 2025-02-11
Attending: EMERGENCY MEDICINE | Admitting: EMERGENCY MEDICINE
Payer: MEDICARE

## 2025-02-11 VITALS
OXYGEN SATURATION: 97 % | RESPIRATION RATE: 16 BRPM | SYSTOLIC BLOOD PRESSURE: 130 MMHG | WEIGHT: 154.98 LBS | TEMPERATURE: 98 F | DIASTOLIC BLOOD PRESSURE: 70 MMHG | HEART RATE: 72 BPM

## 2025-02-11 DIAGNOSIS — Z95.1 PRESENCE OF AORTOCORONARY BYPASS GRAFT: Chronic | ICD-10-CM

## 2025-02-11 DIAGNOSIS — Z98.2 PRESENCE OF CEREBROSPINAL FLUID DRAINAGE DEVICE: Chronic | ICD-10-CM

## 2025-02-11 DIAGNOSIS — Z95.5 PRESENCE OF CORONARY ANGIOPLASTY IMPLANT AND GRAFT: Chronic | ICD-10-CM

## 2025-02-11 DIAGNOSIS — Z98.1 ARTHRODESIS STATUS: Chronic | ICD-10-CM

## 2025-02-11 DIAGNOSIS — Z98.890 OTHER SPECIFIED POSTPROCEDURAL STATES: Chronic | ICD-10-CM

## 2025-02-11 LAB
APPEARANCE UR: CLEAR — SIGNIFICANT CHANGE UP
BILIRUB UR-MCNC: NEGATIVE — SIGNIFICANT CHANGE UP
COLOR SPEC: YELLOW — SIGNIFICANT CHANGE UP
DIFF PNL FLD: NEGATIVE — SIGNIFICANT CHANGE UP
GLUCOSE UR QL: >=1000 MG/DL
KETONES UR-MCNC: NEGATIVE MG/DL — SIGNIFICANT CHANGE UP
LEUKOCYTE ESTERASE UR-ACNC: NEGATIVE — SIGNIFICANT CHANGE UP
NITRITE UR-MCNC: NEGATIVE — SIGNIFICANT CHANGE UP
PH UR: 5 — SIGNIFICANT CHANGE UP (ref 5–8)
PROT UR-MCNC: NEGATIVE MG/DL — SIGNIFICANT CHANGE UP
SP GR SPEC: 1.03 — SIGNIFICANT CHANGE UP (ref 1–1.03)
UROBILINOGEN FLD QL: 0.2 MG/DL — SIGNIFICANT CHANGE UP (ref 0.2–1)

## 2025-02-11 PROCEDURE — 81003 URINALYSIS AUTO W/O SCOPE: CPT

## 2025-02-11 PROCEDURE — 99283 EMERGENCY DEPT VISIT LOW MDM: CPT | Mod: 25

## 2025-02-11 PROCEDURE — 51702 INSERT TEMP BLADDER CATH: CPT

## 2025-02-11 PROCEDURE — 99283 EMERGENCY DEPT VISIT LOW MDM: CPT | Mod: FS

## 2025-02-11 NOTE — ED PROVIDER NOTE - OBJECTIVE STATEMENT
75yo M PMHx CAD, CABG x 4 vessel, stent x 3, HTN, pre-DM, HLD, normal pressure hydrocephalus status post ventriculoperitoneal shunt in 2022, CVA 9/2024 with residual right hemiparesis presents to ED c/o urinary retention. Aide at bedside states pt's wife told her he last urinated around midnight but pt states he also was able to urinate at 4am and urinated a small amount about 1 hour ago. States he does not have significant urge to urinate and denies abdominal pain or distension. Has required a villar catheter previously. Aide reports multiple previous similar episodes where pt complains he has not urinated due to constipation. Pt had bowel movement in ED, now resting comfortably. Denies fever, chills, dysuria, hematuria, back pain, cp, sob, abdominal distension, n/v/d. Post void bladder scan 393mL.

## 2025-02-11 NOTE — ED PROVIDER NOTE - PATIENT PORTAL LINK FT
You can access the FollowMyHealth Patient Portal offered by [facility] by registering at the following website: [email or web address]. By joining [facility]’s FollowMyHealth portal, you will also be able to view your health information using other applications (apps) compatible with our system.

## 2025-02-11 NOTE — ED PROVIDER NOTE - CLINICAL SUMMARY MEDICAL DECISION MAKING FREE TEXT BOX
74-year-old male with history of hypertension coronary artery disease CVA with right hemiparesis, BPH presents with aide from home complaining of urinary retention since 12 AM also reported since 4 AM patient reported to be urinating small amounts 1 hour ago.  Patient denies fever chills cough chest pain shortness of breath abdominal pain blood in the urine back pain trauma.  Aide at bedside states patient frequently feels he cannot urinate secondary to constipation patient had large bowel movement just now in the ED and states he feels better.  Gen: Awake, Alert, WD, WN, NAD  Gastrointestinal/Abdomen:  Soft, nontender, nondistended, no suprapubic tenderness  Pelvis: stable, nontender  Back:  no CVAT, no MLT  Ext:  warm, well perfused, moving all extremities spontaneously, no cyanosis, no erythema, no edema, distal pulses intact  Skin: intact, no rash, no vesicles, no petechiae, no ecchymosis  Neuro:  AAOx3, speech clear   Bladder scan 393ml post void   Rucker catheter, UA, urinary retention, UTI, BPH

## 2025-02-11 NOTE — ED PROVIDER NOTE - PHYSICAL EXAMINATION
Gen: No acute distress, non toxic  Head: NCAT  Eyes: pink conjunctivae, EOMI, PERRL  CV: RRR, nl s1/s2.  Resp: CTAB, normal rate and effort  GI: Abdomen soft, NT, ND. No rebound, no guarding  : No CVAT  Neuro: A&O x 3, R hemiparesis 2/2 cva   MSK: No spine or joint tenderness to palpation  Skin: No rashes. intact and perfused.

## 2025-02-11 NOTE — ED PROVIDER NOTE - PROGRESS NOTE DETAILS
PVR 393mL on bladder scan. will observe and if no further urination plan to place villar and send urine studies [place] catheter placed by RN, patient urinated >400mL in bag. PVR <50mL on bladder scan. Retention secondary to constipation. Pt resting comfortably. UA negative. stable for dc. d/w dr alis padgett, aware of pt status, will follow pt in office.

## 2025-02-11 NOTE — ED PROVIDER NOTE - NSFOLLOWUPINSTRUCTIONS_ED_ALL_ED_FT
- Return to the ED for any new or worsening symptoms.   - Follow up with your doctor within 2-3 days.     Urinary Retention    Urinary retention is the inability to completely empty your bladder. This is a common problem in older men, especially with enlarged prostates. If you are sent home with a villar catheter and a drainage system make sure to keep the drainage bag emptied and lower than your catheter. Keep the villar catheter in until you follow up with a urologist.    SEEK IMMEDIATE MEDICAL CARE IF YOU DEVELOP THE FOLLOWING SYMPTOMS: the catheter stops draining urine, the catheter falls out, abdominal pain, nausea/vomiting, or chills/fever.

## 2025-02-11 NOTE — ED ADULT NURSE NOTE - NSFALLHARMRISKINTERV_ED_ALL_ED
Assistance OOB with selected safe patient handling equipment if applicable/Assistance with ambulation/Communicate risk of Fall with Harm to all staff, patient, and family/Monitor gait and stability/Provide patient with walking aids/Provide visual cue: red socks, yellow wristband, yellow gown, etc/Reinforce activity limits and safety measures with patient and family/Toileting schedule using arm’s reach rule for commode and bathroom/Bed in lowest position, wheels locked, appropriate side rails in place/Call bell, personal items and telephone in reach/Instruct patient to call for assistance before getting out of bed/chair/stretcher/Non-slip footwear applied when patient is off stretcher/[place] to call system/Physically safe environment - no spills, clutter or unnecessary equipment/Purposeful Proactive Rounding/Room/bathroom lighting operational, light cord in reach

## 2025-03-22 ENCOUNTER — INPATIENT (INPATIENT)
Facility: HOSPITAL | Age: 75
LOS: 2 days | Discharge: HOME | DRG: 179 | End: 2025-03-25
Attending: HOSPITALIST | Admitting: HOSPITALIST
Payer: MEDICARE

## 2025-03-22 VITALS
SYSTOLIC BLOOD PRESSURE: 133 MMHG | TEMPERATURE: 98 F | OXYGEN SATURATION: 93 % | HEIGHT: 67 IN | HEART RATE: 78 BPM | DIASTOLIC BLOOD PRESSURE: 76 MMHG | RESPIRATION RATE: 20 BRPM | WEIGHT: 149.91 LBS

## 2025-03-22 DIAGNOSIS — Z98.1 ARTHRODESIS STATUS: Chronic | ICD-10-CM

## 2025-03-22 DIAGNOSIS — U07.1 COVID-19: ICD-10-CM

## 2025-03-22 DIAGNOSIS — Z98.2 PRESENCE OF CEREBROSPINAL FLUID DRAINAGE DEVICE: Chronic | ICD-10-CM

## 2025-03-22 DIAGNOSIS — Z98.890 OTHER SPECIFIED POSTPROCEDURAL STATES: Chronic | ICD-10-CM

## 2025-03-22 DIAGNOSIS — Z95.5 PRESENCE OF CORONARY ANGIOPLASTY IMPLANT AND GRAFT: Chronic | ICD-10-CM

## 2025-03-22 DIAGNOSIS — Z95.1 PRESENCE OF AORTOCORONARY BYPASS GRAFT: Chronic | ICD-10-CM

## 2025-03-22 LAB
ALBUMIN SERPL ELPH-MCNC: 3.5 G/DL — SIGNIFICANT CHANGE UP (ref 3.3–5)
ALP SERPL-CCNC: 78 U/L — SIGNIFICANT CHANGE UP (ref 40–120)
ALT FLD-CCNC: 21 U/L — SIGNIFICANT CHANGE UP (ref 12–78)
ANION GAP SERPL CALC-SCNC: 6 MMOL/L — SIGNIFICANT CHANGE UP (ref 5–17)
APPEARANCE UR: CLEAR — SIGNIFICANT CHANGE UP
APTT BLD: 22.2 SEC — LOW (ref 24.5–35.6)
AST SERPL-CCNC: 18 U/L — SIGNIFICANT CHANGE UP (ref 15–37)
BASOPHILS # BLD AUTO: 0.01 K/UL — SIGNIFICANT CHANGE UP (ref 0–0.2)
BASOPHILS NFR BLD AUTO: 0.1 % — SIGNIFICANT CHANGE UP (ref 0–2)
BILIRUB SERPL-MCNC: 0.7 MG/DL — SIGNIFICANT CHANGE UP (ref 0.2–1.2)
BILIRUB UR-MCNC: NEGATIVE — SIGNIFICANT CHANGE UP
BUN SERPL-MCNC: 20 MG/DL — SIGNIFICANT CHANGE UP (ref 7–23)
CALCIUM SERPL-MCNC: 8.8 MG/DL — SIGNIFICANT CHANGE UP (ref 8.5–10.1)
CHLORIDE SERPL-SCNC: 106 MMOL/L — SIGNIFICANT CHANGE UP (ref 96–108)
CO2 SERPL-SCNC: 26 MMOL/L — SIGNIFICANT CHANGE UP (ref 22–31)
COLOR SPEC: YELLOW — SIGNIFICANT CHANGE UP
CREAT SERPL-MCNC: 0.79 MG/DL — SIGNIFICANT CHANGE UP (ref 0.5–1.3)
DIFF PNL FLD: NEGATIVE — SIGNIFICANT CHANGE UP
EGFR: 93 ML/MIN/1.73M2 — SIGNIFICANT CHANGE UP
EGFR: 93 ML/MIN/1.73M2 — SIGNIFICANT CHANGE UP
EOSINOPHIL # BLD AUTO: 0.03 K/UL — SIGNIFICANT CHANGE UP (ref 0–0.5)
EOSINOPHIL NFR BLD AUTO: 0.3 % — SIGNIFICANT CHANGE UP (ref 0–6)
FLUAV AG NPH QL: SIGNIFICANT CHANGE UP
FLUBV AG NPH QL: SIGNIFICANT CHANGE UP
GLUCOSE SERPL-MCNC: 141 MG/DL — HIGH (ref 70–99)
GLUCOSE UR QL: >=1000 MG/DL
HCT VFR BLD CALC: 40.2 % — SIGNIFICANT CHANGE UP (ref 39–50)
HGB BLD-MCNC: 13.7 G/DL — SIGNIFICANT CHANGE UP (ref 13–17)
IMM GRANULOCYTES NFR BLD AUTO: 0.3 % — SIGNIFICANT CHANGE UP (ref 0–0.9)
INR BLD: 1.03 RATIO — SIGNIFICANT CHANGE UP (ref 0.85–1.16)
KETONES UR-MCNC: ABNORMAL MG/DL
LACTATE SERPL-SCNC: 1.3 MMOL/L — SIGNIFICANT CHANGE UP (ref 0.7–2)
LEUKOCYTE ESTERASE UR-ACNC: NEGATIVE — SIGNIFICANT CHANGE UP
LYMPHOCYTES # BLD AUTO: 0.6 K/UL — LOW (ref 1–3.3)
LYMPHOCYTES # BLD AUTO: 6.1 % — LOW (ref 13–44)
MCHC RBC-ENTMCNC: 30.3 PG — SIGNIFICANT CHANGE UP (ref 27–34)
MCHC RBC-ENTMCNC: 34.1 G/DL — SIGNIFICANT CHANGE UP (ref 32–36)
MCV RBC AUTO: 88.9 FL — SIGNIFICANT CHANGE UP (ref 80–100)
MONOCYTES # BLD AUTO: 1.04 K/UL — HIGH (ref 0–0.9)
MONOCYTES NFR BLD AUTO: 10.6 % — SIGNIFICANT CHANGE UP (ref 2–14)
NEUTROPHILS # BLD AUTO: 8.13 K/UL — HIGH (ref 1.8–7.4)
NEUTROPHILS NFR BLD AUTO: 82.6 % — HIGH (ref 43–77)
NITRITE UR-MCNC: NEGATIVE — SIGNIFICANT CHANGE UP
NRBC BLD AUTO-RTO: 0 /100 WBCS — SIGNIFICANT CHANGE UP (ref 0–0)
PH UR: 5 — SIGNIFICANT CHANGE UP (ref 5–8)
PLATELET # BLD AUTO: 173 K/UL — SIGNIFICANT CHANGE UP (ref 150–400)
POTASSIUM SERPL-MCNC: 4 MMOL/L — SIGNIFICANT CHANGE UP (ref 3.5–5.3)
POTASSIUM SERPL-SCNC: 4 MMOL/L — SIGNIFICANT CHANGE UP (ref 3.5–5.3)
PROT SERPL-MCNC: 6.9 G/DL — SIGNIFICANT CHANGE UP (ref 6–8.3)
PROT UR-MCNC: NEGATIVE MG/DL — SIGNIFICANT CHANGE UP
PROTHROM AB SERPL-ACNC: 12.2 SEC — SIGNIFICANT CHANGE UP (ref 9.9–13.4)
RBC # BLD: 4.52 M/UL — SIGNIFICANT CHANGE UP (ref 4.2–5.8)
RBC # FLD: 15.1 % — HIGH (ref 10.3–14.5)
RSV RNA NPH QL NAA+NON-PROBE: SIGNIFICANT CHANGE UP
SARS-COV-2 RNA SPEC QL NAA+PROBE: DETECTED
SODIUM SERPL-SCNC: 138 MMOL/L — SIGNIFICANT CHANGE UP (ref 135–145)
SOURCE RESPIRATORY: SIGNIFICANT CHANGE UP
SP GR SPEC: 1.04 — HIGH (ref 1–1.03)
UROBILINOGEN FLD QL: 0.2 MG/DL — SIGNIFICANT CHANGE UP (ref 0.2–1)
WBC # BLD: 9.84 K/UL — SIGNIFICANT CHANGE UP (ref 3.8–10.5)
WBC # FLD AUTO: 9.84 K/UL — SIGNIFICANT CHANGE UP (ref 3.8–10.5)

## 2025-03-22 PROCEDURE — 99285 EMERGENCY DEPT VISIT HI MDM: CPT

## 2025-03-22 PROCEDURE — 99222 1ST HOSP IP/OBS MODERATE 55: CPT

## 2025-03-22 PROCEDURE — 71045 X-RAY EXAM CHEST 1 VIEW: CPT | Mod: 26

## 2025-03-22 PROCEDURE — 93010 ELECTROCARDIOGRAM REPORT: CPT

## 2025-03-22 PROCEDURE — 70450 CT HEAD/BRAIN W/O DYE: CPT | Mod: 26

## 2025-03-22 RX ORDER — NYSTATIN 100000 [USP'U]/G
1 CREAM TOPICAL
Refills: 0 | DISCHARGE

## 2025-03-22 RX ORDER — REMDESIVIR 5 MG/ML
100 INJECTION INTRAVENOUS EVERY 24 HOURS
Refills: 0 | Status: DISCONTINUED | OUTPATIENT
Start: 2025-03-23 | End: 2025-03-24

## 2025-03-22 RX ORDER — GLUCAGON 3 MG/1
1 POWDER NASAL ONCE
Refills: 0 | Status: DISCONTINUED | OUTPATIENT
Start: 2025-03-22 | End: 2025-03-25

## 2025-03-22 RX ORDER — CLONAZEPAM 0.5 MG/1
1 TABLET ORAL
Refills: 0 | DISCHARGE

## 2025-03-22 RX ORDER — INSULIN LISPRO 100 U/ML
INJECTION, SOLUTION INTRAVENOUS; SUBCUTANEOUS AT BEDTIME
Refills: 0 | Status: DISCONTINUED | OUTPATIENT
Start: 2025-03-22 | End: 2025-03-25

## 2025-03-22 RX ORDER — PAROXETINE HYDROCHLORIDE 20 MG/1
1 TABLET, FILM COATED ORAL
Refills: 0 | DISCHARGE

## 2025-03-22 RX ORDER — CLOPIDOGREL BISULFATE 75 MG/1
75 TABLET, FILM COATED ORAL DAILY
Refills: 0 | Status: DISCONTINUED | OUTPATIENT
Start: 2025-03-22 | End: 2025-03-25

## 2025-03-22 RX ORDER — DEXTROSE 50 % IN WATER 50 %
15 SYRINGE (ML) INTRAVENOUS ONCE
Refills: 0 | Status: DISCONTINUED | OUTPATIENT
Start: 2025-03-22 | End: 2025-03-25

## 2025-03-22 RX ORDER — ACETAMINOPHEN 500 MG/5ML
2 LIQUID (ML) ORAL
Refills: 0 | DISCHARGE

## 2025-03-22 RX ORDER — PAROXETINE HYDROCHLORIDE 20 MG/1
20 TABLET, FILM COATED ORAL DAILY
Refills: 0 | Status: DISCONTINUED | OUTPATIENT
Start: 2025-03-22 | End: 2025-03-25

## 2025-03-22 RX ORDER — DEXTROSE 50 % IN WATER 50 %
12.5 SYRINGE (ML) INTRAVENOUS ONCE
Refills: 0 | Status: DISCONTINUED | OUTPATIENT
Start: 2025-03-22 | End: 2025-03-25

## 2025-03-22 RX ORDER — MELATONIN 5 MG
1 TABLET ORAL
Refills: 0 | DISCHARGE

## 2025-03-22 RX ORDER — DAPAGLIFLOZIN 5 MG/1
1 TABLET, FILM COATED ORAL
Refills: 0 | DISCHARGE

## 2025-03-22 RX ORDER — ONDANSETRON HCL/PF 4 MG/2 ML
1 VIAL (ML) INJECTION
Refills: 0 | DISCHARGE

## 2025-03-22 RX ORDER — ACETAMINOPHEN 500 MG/5ML
650 LIQUID (ML) ORAL EVERY 6 HOURS
Refills: 0 | Status: DISCONTINUED | OUTPATIENT
Start: 2025-03-22 | End: 2025-03-25

## 2025-03-22 RX ORDER — SIMETHICONE 80 MG
1 TABLET,CHEWABLE ORAL
Refills: 0 | DISCHARGE

## 2025-03-22 RX ORDER — DEXTROSE 50 % IN WATER 50 %
25 SYRINGE (ML) INTRAVENOUS ONCE
Refills: 0 | Status: DISCONTINUED | OUTPATIENT
Start: 2025-03-22 | End: 2025-03-25

## 2025-03-22 RX ORDER — MAGNESIUM OXIDE 400 MG
400 TABLET ORAL DAILY
Refills: 0 | Status: DISCONTINUED | OUTPATIENT
Start: 2025-03-22 | End: 2025-03-25

## 2025-03-22 RX ORDER — LOSARTAN POTASSIUM 100 MG/1
50 TABLET, FILM COATED ORAL DAILY
Refills: 0 | Status: DISCONTINUED | OUTPATIENT
Start: 2025-03-22 | End: 2025-03-25

## 2025-03-22 RX ORDER — SODIUM CHLORIDE 9 G/1000ML
1000 INJECTION, SOLUTION INTRAVENOUS
Refills: 0 | Status: DISCONTINUED | OUTPATIENT
Start: 2025-03-22 | End: 2025-03-25

## 2025-03-22 RX ORDER — TAMSULOSIN HYDROCHLORIDE 0.4 MG/1
0.4 CAPSULE ORAL AT BEDTIME
Refills: 0 | Status: DISCONTINUED | OUTPATIENT
Start: 2025-03-22 | End: 2025-03-25

## 2025-03-22 RX ORDER — METOPROLOL SUCCINATE 50 MG/1
100 TABLET, EXTENDED RELEASE ORAL DAILY
Refills: 0 | Status: DISCONTINUED | OUTPATIENT
Start: 2025-03-22 | End: 2025-03-25

## 2025-03-22 RX ORDER — POLYETHYLENE GLYCOL 3350 17 G/17G
17 POWDER, FOR SOLUTION ORAL
Refills: 0 | DISCHARGE

## 2025-03-22 RX ORDER — CLOPIDOGREL BISULFATE 75 MG/1
1 TABLET, FILM COATED ORAL
Refills: 0 | DISCHARGE

## 2025-03-22 RX ORDER — REMDESIVIR 5 MG/ML
200 INJECTION INTRAVENOUS EVERY 24 HOURS
Refills: 0 | Status: COMPLETED | OUTPATIENT
Start: 2025-03-22 | End: 2025-03-22

## 2025-03-22 RX ORDER — ACETAMINOPHEN 500 MG/5ML
1000 LIQUID (ML) ORAL ONCE
Refills: 0 | Status: COMPLETED | OUTPATIENT
Start: 2025-03-22 | End: 2025-03-22

## 2025-03-22 RX ORDER — HEPARIN SODIUM 1000 [USP'U]/ML
5000 INJECTION INTRAVENOUS; SUBCUTANEOUS EVERY 8 HOURS
Refills: 0 | Status: DISCONTINUED | OUTPATIENT
Start: 2025-03-22 | End: 2025-03-25

## 2025-03-22 RX ORDER — MAGNESIUM OXIDE 400 MG
400 TABLET ORAL
Refills: 0 | DISCHARGE

## 2025-03-22 RX ORDER — MELATONIN 5 MG
3 TABLET ORAL AT BEDTIME
Refills: 0 | Status: DISCONTINUED | OUTPATIENT
Start: 2025-03-22 | End: 2025-03-23

## 2025-03-22 RX ORDER — CEFTRIAXONE 500 MG/1
1000 INJECTION, POWDER, FOR SOLUTION INTRAMUSCULAR; INTRAVENOUS ONCE
Refills: 0 | Status: COMPLETED | OUTPATIENT
Start: 2025-03-22 | End: 2025-03-22

## 2025-03-22 RX ORDER — ROSUVASTATIN CALCIUM 5 MG/1
40 TABLET, FILM COATED ORAL AT BEDTIME
Refills: 0 | Status: DISCONTINUED | OUTPATIENT
Start: 2025-03-22 | End: 2025-03-25

## 2025-03-22 RX ORDER — INSULIN LISPRO 100 U/ML
INJECTION, SOLUTION INTRAVENOUS; SUBCUTANEOUS
Refills: 0 | Status: DISCONTINUED | OUTPATIENT
Start: 2025-03-22 | End: 2025-03-25

## 2025-03-22 RX ORDER — REMDESIVIR 5 MG/ML
INJECTION INTRAVENOUS
Refills: 0 | Status: DISCONTINUED | OUTPATIENT
Start: 2025-03-22 | End: 2025-03-24

## 2025-03-22 RX ADMIN — CEFTRIAXONE 100 MILLIGRAM(S): 500 INJECTION, POWDER, FOR SOLUTION INTRAMUSCULAR; INTRAVENOUS at 05:22

## 2025-03-22 RX ADMIN — HEPARIN SODIUM 5000 UNIT(S): 1000 INJECTION INTRAVENOUS; SUBCUTANEOUS at 21:26

## 2025-03-22 RX ADMIN — Medication 1000 MILLILITER(S): at 05:12

## 2025-03-22 RX ADMIN — HEPARIN SODIUM 5000 UNIT(S): 1000 INJECTION INTRAVENOUS; SUBCUTANEOUS at 15:35

## 2025-03-22 RX ADMIN — Medication 650 MILLIGRAM(S): at 11:20

## 2025-03-22 RX ADMIN — TAMSULOSIN HYDROCHLORIDE 0.4 MILLIGRAM(S): 0.4 CAPSULE ORAL at 21:26

## 2025-03-22 RX ADMIN — Medication 650 MILLIGRAM(S): at 21:26

## 2025-03-22 RX ADMIN — Medication 650 MILLIGRAM(S): at 10:39

## 2025-03-22 RX ADMIN — Medication 1000 MILLIGRAM(S): at 06:00

## 2025-03-22 RX ADMIN — Medication 400 MILLIGRAM(S): at 05:14

## 2025-03-22 RX ADMIN — REMDESIVIR 200 MILLIGRAM(S): 5 INJECTION INTRAVENOUS at 10:06

## 2025-03-22 RX ADMIN — ROSUVASTATIN CALCIUM 40 MILLIGRAM(S): 5 TABLET, FILM COATED ORAL at 21:26

## 2025-03-22 NOTE — ED ADULT TRIAGE NOTE - CHIEF COMPLAINT QUOTE
BIBA from home with complaints of AMS started at 9pm yesterday, pt has some left sided weakness from pervious stroke. family voiced his baseline is aox2 but yesterday started to become verbally aggressive

## 2025-03-22 NOTE — ED ADULT NURSE NOTE - OBJECTIVE STATEMENT
74 y.o male presents to ED with chief complaint of AMS. Wife at bedside states pt has been demonstrating increased agitation. Pt denies any dysuria or flu cold like symptoms.

## 2025-03-22 NOTE — H&P ADULT - NSHPPHYSICALEXAM_GEN_ALL_CORE
T(C): 37.8 (03-22-25 @ 06:00), Max: 39.3 (03-22-25 @ 04:35)  HR: 78 (03-22-25 @ 04:01) (78 - 78)  BP: 101/70 (03-22-25 @ 06:00) (101/70 - 136/68)  RR: 18 (03-22-25 @ 06:00) (18 - 20)  SpO2: 99% (03-22-25 @ 06:00) (93% - 99%)  Wt(kg): --    Physical Exam:   GENERAL: well-groomed, well-developed, NAD  HEENT: head NC/AT; EOM intact, PERRLA, conjunctiva & sclera clear; hearing grossly intact, moist mucous membranes  NECK: supple, no JVD  RESPIRATORY: CTA B/L, no wheezing, rales, rhonchi or rubs  CARDIOVASCULAR: S1&S2, RRR, no murmurs or gallops  ABDOMEN: soft, non-tender, non-distended, + Bowel sounds x4 quadrants, no guarding, rebound or rigidity  MUSCULOSKELETAL:  no clubbing, cyanosis or edema of all 4 extremities  LYMPH: no cervical lymphadenopathy  VASCULAR: Radial pulses 2+ bilaterally, no varicose veins   SKIN: warm and dry, color normal  NEUROLOGIC: AA&O X2, CN2-12 intact w/ no focal deficits, no sensory loss, MAEx4 T(C): 37.8 (03-22-25 @ 06:00), Max: 39.3 (03-22-25 @ 04:35)  HR: 78 (03-22-25 @ 04:01) (78 - 78)  BP: 101/70 (03-22-25 @ 06:00) (101/70 - 136/68)  RR: 18 (03-22-25 @ 06:00) (18 - 20)  SpO2: 99% (03-22-25 @ 06:00) (93% - 99%)  Wt(kg): --    Physical Exam:   GENERAL: well-groomed, well-developed, NAD  HEENT: head NC/AT; EOM intact, PERRLA, conjunctiva & sclera clear; hearing grossly intact, moist mucous membranes  NECK: supple, no JVD  RESPIRATORY: CTA B/L, no wheezing, rales, rhonchi or rubs  CARDIOVASCULAR: S1&S2, RRR, no murmurs or gallops  ABDOMEN: soft, non-tender, non-distended, + Bowel sounds x4 quadrants, no guarding, rebound or rigidity  MUSCULOSKELETAL:  no clubbing, cyanosis or edema of all 4 extremities  LYMPH: no cervical lymphadenopathy  VASCULAR: Radial pulses 2+ bilaterally, no varicose veins   SKIN: warm and dry, color normal  NEUROLOGIC: AA&O X2, CN2-12 intact w/ no focal deficits, no sensory loss, MAEx4, RUE in flexion contracture. muscle strength 3/5 RUE/RLE, 4/5 in LUE/LLE

## 2025-03-22 NOTE — CONSULT NOTE ADULT - SUBJECTIVE AND OBJECTIVE BOX
[facility]  IAN Arellano S. Shah, Y. Patel, G. [facility]  [phone]    KAL, GINA  74y, Male  [id number]    HPI--  HPI:  Mr Vargas is a 75yo M presenting with AMS found to have COVID. 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   Patient brought in by EMS due to patients wife reporting that patient has been more confused and hallucinating since yesterday evening. Patient is A and O x2 currently, he reports a cough, without any associated SOB or chest pain or palpitations. He had a fever of 102.7F. He is tolerating PO intake. Denies headaches, nausea, vomiting, chest pain, SOB, abdominal pain, constipation, diarrhea, melena, hematochezia, dysuria.     In ER patient found to have COVID.    (22 Mar 2025 08:02)        Active Medications--  acetaminophen     Tablet .. 650 milliGRAM(s) Oral every 6 hours PRN  heparin   Injectable 5000 Unit(s) SubCutaneous every 8 hours  remdesivir  IVPB   IV Intermittent     Antimicrobials:   remdesivir  IVPB   IV Intermittent     Immunologic:     ROS:  CONSTITUTIONAL: No fevers or chills. No weakness or headache. No weight changes.  EYES/ENT: No visual or hearing changes. No sore throat or throat pain .  NECK: No pain or stiffness  RESPIRATORY: No cough, wheezing, or hemoptysis. No shortness of breath  CARDIOVASCULAR: No chest pain or palpitations  GASTROINTESTINAL: No abdominal pain. No nausea or vomiting. No diarrhea or constipation.  GENITOURINARY: No dysuria, frequency or hematuria  NEUROLOGICAL: No numbness or weakness  SKIN: No itching or rashes  PSYCHIATRIC: Pleasant. Appropriate affect    Allergies: codeine (Vomiting; Nausea)  tramadol (Chills; Nausea)  codeine (Other)  tramadol (Rash)    PMH -- Hypertension    CAD (coronary artery disease)    Hyperlipidemia    Type 2 diabetes mellitus    H/O: depression    GERD (gastroesophageal reflux disease)    Hydrocephalus    2019 novel coronavirus disease (COVID-19)      PSH -- S/P  shunt    Stented coronary artery    S/P bilateral inguinal hernia repair    S/P quadruple vessel bypass    S/P cervical spinal fusion    H/O melanoma excision      FH -- No pertinent family history in first degree relatives      Social History --  EtOH: denies   Tobacco: denies   Drug Use: denies     Travel/Environmental/Occupational History:    Physical Exam--  Vital Signs Last 24 Hrs  T(F): 98.2 (22 Mar 2025 09:37), Max: 102.7 (22 Mar 2025 04:35)  HR: 65 (22 Mar 2025 09:37) (65 - 78)  BP: 136/75 (22 Mar 2025 09:37) (101/70 - 136/75)  RR: 17 (22 Mar 2025 09:37) (17 - 20)  SpO2: 98% (22 Mar 2025 09:37) (93% - 99%)  General: nontoxic-appearing, no acute distress  HEENT: NC/AT, EOMI  Lungs:   Heart: Regular rate and rhythm.   Abdomen: Soft. Nondistended. Nontender.   Extremities: No cyanosis or clubbing. No edema.   Skin: Warm. Dry. Good turgor. No rash.     Laboratory & Imaging Data:  CBC:                       13.7   9.84  )-----------( 173      ( 22 Mar 2025 04:55 )             40.2     CMP: 03-    138  |  106  |  20  ----------------------------<  141[H]  4.0   |  26  |  0.79    Ca    8.8      22 Mar 2025 04:55    TPro  6.9  /  Alb  3.5  /  TBili  0.7  /  DBili  x   /  AST  18  /  ALT  21  /  AlkPhos  78  03-22    LIVER FUNCTIONS - ( 22 Mar 2025 04:55 )  Alb: 3.5 g/dL / Pro: 6.9 g/dL / ALK PHOS: 78 U/L / ALT: 21 U/L / AST: 18 U/L / GGT: x           Urinalysis Basic - ( 22 Mar 2025 09:00 )    Color: Yellow / Appearance: Clear / S.038 / pH: x  Gluc: x / Ketone: Trace mg/dL  / Bili: Negative / Urobili: 0.2 mg/dL   Blood: x / Protein: Negative mg/dL / Nitrite: Negative   Leuk Esterase: Negative / RBC: x / WBC x   Sq Epi: x / Non Sq Epi: x / Bacteria: x        Microbiology: reviewed    Urinalysis with Rflx Culture (collected 25 @ 09:00)          Radiology: reviewed     [facility]  IAN Arellano S. Shah, Y. Patel, G. [facility]  [phone]    KAL, GINA  74y, Male  [id number]    HPI--  HPI:  Mr Vargas is a 73yo M presenting with AMS found to have COVID. 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   Patient brought in by EMS due to patients wife reporting that patient has been more confused and hallucinating since yesterday evening. Patient is A and O x2 currently, he reports a cough, without any associated SOB or chest pain or palpitations. He had a fever of 102.7F. He is tolerating PO intake. Denies headaches, nausea, vomiting, chest pain, SOB, abdominal pain, constipation, diarrhea, melena, hematochezia, dysuria.     In ER patient found to have COVID.    (22 Mar 2025 08:02)    Pt seen at bedside  Hx as above        Active Medications--  acetaminophen     Tablet .. 650 milliGRAM(s) Oral every 6 hours PRN  heparin   Injectable 5000 Unit(s) SubCutaneous every 8 hours  remdesivir  IVPB   IV Intermittent     Antimicrobials:   remdesivir  IVPB   IV Intermittent     Immunologic:     ROS:  CONSTITUTIONAL: No fevers or chills. No weakness or headache. No weight changes.  EYES/ENT: No visual or hearing changes. No sore throat or throat pain .  NECK: No pain or stiffness  RESPIRATORY: No cough, wheezing, or hemoptysis. No shortness of breath  CARDIOVASCULAR: No chest pain or palpitations  GASTROINTESTINAL: No abdominal pain. No nausea or vomiting. No diarrhea or constipation.  GENITOURINARY: No dysuria, frequency or hematuria  NEUROLOGICAL: No numbness or weakness  SKIN: No itching or rashes  PSYCHIATRIC: Pleasant. Appropriate affect    Allergies: codeine (Vomiting; Nausea)  tramadol (Chills; Nausea)  codeine (Other)  tramadol (Rash)    PMH -- Hypertension    CAD (coronary artery disease)    Hyperlipidemia    Type 2 diabetes mellitus    H/O: depression    GERD (gastroesophageal reflux disease)    Hydrocephalus    2019 novel coronavirus disease (COVID-19)      PSH -- S/P  shunt    Stented coronary artery    S/P bilateral inguinal hernia repair    S/P quadruple vessel bypass    S/P cervical spinal fusion    H/O melanoma excision      FH -- No pertinent family history in first degree relatives      Social History --  EtOH: denies   Tobacco: denies   Drug Use: denies     Travel/Environmental/Occupational History:    Physical Exam--  Vital Signs Last 24 Hrs  T(F): 98.2 (22 Mar 2025 09:37), Max: 102.7 (22 Mar 2025 04:35)  HR: 65 (22 Mar 2025 09:37) (65 - 78)  BP: 136/75 (22 Mar 2025 09:37) (101/70 - 136/75)  RR: 17 (22 Mar 2025 09:37) (17 - 20)  SpO2: 98% (22 Mar 2025 09:37) (93% - 99%)  General: nontoxic-appearing, no acute distress  HEENT: NC/AT, EOMI  Lungs: decreased breath sounds  Heart: Regular rate and rhythm.   Abdomen: Soft. Nondistended. Nontender.   Extremities: No cyanosis or clubbing. No edema.   Skin: Warm. Dry. Good turgor. No rash.     Laboratory & Imaging Data:  CBC:                       13.7   9.84  )-----------( 173      ( 22 Mar 2025 04:55 )             40.2     CMP: 03-    138  |  106  |  20  ----------------------------<  141[H]  4.0   |  26  |  0.79    Ca    8.8      22 Mar 2025 04:55    TPro  6.9  /  Alb  3.5  /  TBili  0.7  /  DBili  x   /  AST  18  /  ALT  21  /  AlkPhos  78  03-22    LIVER FUNCTIONS - ( 22 Mar 2025 04:55 )  Alb: 3.5 g/dL / Pro: 6.9 g/dL / ALK PHOS: 78 U/L / ALT: 21 U/L / AST: 18 U/L / GGT: x           Urinalysis Basic - ( 22 Mar 2025 09:00 )    Color: Yellow / Appearance: Clear / S.038 / pH: x  Gluc: x / Ketone: Trace mg/dL  / Bili: Negative / Urobili: 0.2 mg/dL   Blood: x / Protein: Negative mg/dL / Nitrite: Negative   Leuk Esterase: Negative / RBC: x / WBC x   Sq Epi: x / Non Sq Epi: x / Bacteria: x        Microbiology: reviewed    Urinalysis with Rflx Culture (collected 25 @ 09:00)          Radiology: reviewed    < from: CT Head No Cont (25 @ 08:34) >    ACC: [id number] EXAM:  CT BRAIN   ORDERED BY: VALERIE TORRES     PROCEDURE DATE:  2025          INTERPRETATION:  CT head without IV contrast    CLINICAL INFORMATION:  Follow-up   Intracranial hemorrhage.    TECHNIQUE: Contiguous axial 5 mm sections were obtained through the head.   Sagittal and coronal 2-D reformatted images were also obtained.   This   scan was performed using automatic exposure control (radiation dose   reduction software) to obtain a diagnostic image quality scan with   patientdose as low as reasonably achievable.    FINDINGS:   CT dated 2024 available for review.    The brain demonstrates mild periventricular white matter ischemia with   gliosis in the RIGHT parietal lobe in the region of the RIGHT shunt   catheter.1 cm colloid cyst again noted at the anterior roof of third   ventricle. Focal calcification in the RIGHT tectum is again noted.   No   acute cerebral cortical infarct is seen.  No intracranial hemorrhage is   found.  No mass effect is found in the brain.    The ventricles show mild ventriculomegaly.    The orbits are unremarkable.  The paranasal sinuses are significant for   mild mucosal thickening in the RIGHT maxillary and ethmoid sinuses.  The   nasal cavity appears intact.  The nasopharynx is symmetric.  The central   skull base, petrous temporal bones and calvarium demonstrates a small   RIGHT parietal craniotomy and RIGHT parietal luzma hole.      IMPRESSION:   Stable examination.    --- End of Report ---            DHIRAJ DICKENS MD; Attending Radiologist  This document has been electronically signed. Mar 22 2025  9:06AM    < end of copied text >    < from: Xray Chest 1 View AP/PA (25 @ 06:15) >    ACC: [id number] EXAM:  XR CHEST AP OR PA 1V   ORDERED BY: VALERIE TORRES     PROCEDURE DATE:  2025          INTERPRETATION:  History: Cough and fever    Technique: Single frontal view of the chest    Comparison: 9/3/2024    Findings:    Lungs: Clear.    Heart/Mediastinum: Again noted to be status post CABG. New loop recorder.   Heart size is stable.    Osseous structures: Sternotomy wires again present. Degenerative changes   of both shoulders.    Impression:    New loop recorder. No acute pulmonary disease.    --- End of Report ---            ESTRELLA HOLDER MD; Attending Radiologist  This document has been electronically signed. Mar 22 2025 11:06AM    < end of copied text >

## 2025-03-22 NOTE — H&P ADULT - HISTORY OF PRESENT ILLNESS
Mr Alicia is a 73yo M presenting with AMS found to have COVID. 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   Patient brought in by EMS due to patients wife reporting that patient has been more confused and hallucinating since yesterday evening. Patient is A and O x2 currently, he reports a cough, without any associated SOB or chest pain or palpitations. He had a fever of 102.7F. He is tolerating PO intake. Denies headaches, nausea, vomiting, chest pain, SOB, abdominal pain, constipation, diarrhea, melena, hematochezia, dysuria.     In ER patient found to have COVID.    Mr Vargas is a 73yo M presenting with AMS found to have COVID. 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   Patient brought in by EMS due to patients wife reporting that patient has been more confused and hallucinating since yesterday evening. Patient is A and O x2 currently, he reports a cough, without any associated SOB or chest pain or palpitations. He had a fever of 102.7F. He is tolerating PO intake. Denies headaches, nausea, vomiting, chest pain, SOB, abdominal pain, constipation, diarrhea, melena, hematochezia, dysuria.     In ER patient found to have COVID.

## 2025-03-22 NOTE — H&P ADULT - ASSESSMENT
Mr Alicia is a 75yo M presenting with AMS found to have COVID. 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    COVID-19: likely contributing to his AMS.   -f/u official CT head result [no evidence of hemorrhage as per my read, f/u radiologist result]  -will start Remdesivir  -ID consulted Dr Janee Lockhart    HLD: continue statin    CAD s/p CABG: continue Toprol, statin, ASA, ARB    HTN: continue losartan    Pre-Diabetes: check A1c  -patient takes SGLT-2 inhibitor, will hold    NPH s/p  shunt: f/u CT head    DVT ppx: heparin Mr Alicia is a 73yo M presenting with AMS found to have COVID. 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    COVID-19: likely contributing to his AMS.   -f/u official CT head result [no evidence of hemorrhage as per my read, f/u radiologist result]  -will start Remdesivir  -ID consulted Dr Janee Lockhart    HLD: continue rosuvastatin    CAD s/p CABG: continue Toprol, statin, Plavix, losartan    HTN: continue losartan    Diabetes type 2: check A1c  -patient takes SGLT-2 inhibitor, will hold    Mood disorder: continue paxil    BPH: continue flomax    NPH s/p  shunt: f/u CT head    Hx of CVA: now has loop recorder placed to eval for arrythmia such as A fib as contributing cause to previous stroke  -continue plavix and statin    DVT ppx: heparin    Updated patients PCP: Dr Temple via phone Mr Alicia is a 73yo M presenting with AMS found to have COVID. 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    COVID-19: likely contributing to his AMS.   -f/u official CT head result [no evidence of hemorrhage as per my read, f/u radiologist result]  -will start Remdesivir  -ID consulted Dr Janee Lockhart    HLD: continue rosuvastatin    CAD s/p CABG: continue Toprol, statin, Plavix, losartan    HTN: continue losartan    Diabetes type 2: check A1c  -patient takes SGLT-2 inhibitor, will hold    Mood disorder: continue paxil    BPH: continue flomax    NPH s/p  shunt: f/u CT head    Hx of CVA: now has loop recorder placed to eval for arrythmia such as A fib as contributing cause to previous stroke  -continue plavix and statin    ? Afib: on cardiac monitor appears like A fib.   -will obtain repeat EKG [previous ekg was sinus rhythm  -cardio consult Dr Silverman    DVT ppx: heparin    Updated patients PCP: Dr Temple via phone

## 2025-03-22 NOTE — PATIENT PROFILE ADULT - FALL HARM RISK - HARM RISK INTERVENTIONS

## 2025-03-22 NOTE — ED PROVIDER NOTE - IV ALTEPLASE INCLUSION HIDDEN
----- Message from Zuleika Rosales sent at 3/11/2024  9:25 AM CDT -----  Contact: nae Condon  MRN: [id number]  : 1991  PCP: Leigh Ann Nunez  Home Phone      [phone]  Work Phone      Not on file.  Mobile          [phone]      MESSAGE:   Pt requesting refill or new Rx.   Is this a refill or new RX:  refill  RX name and strength: lisdexamfetamine (VYVANSE) 50 MG capsule  Last office visit: 23  Is this a 30-day or 90-day RX:  30  Pharmacy name and location:  [facility] [address] [place] [address]  Comments:      Phone:  [phone]     show

## 2025-03-22 NOTE — PHARMACOTHERAPY INTERVENTION NOTE - COMMENTS
Transcribed medication list provided from Dr. ELLY Lockhart from [facility].  Informed MD that medication reconciliation complete.

## 2025-03-22 NOTE — ED ADULT NURSE NOTE - NSFALLHARMRISKINTERV_ED_ALL_ED
Assistance OOB with selected safe patient handling equipment if applicable/Assistance with ambulation/Communicate risk of Fall with Harm to all staff, patient, and family/Monitor gait and stability/Provide visual cue: red socks, yellow wristband, yellow gown, etc/Reinforce activity limits and safety measures with patient and family/Bed in lowest position, wheels locked, appropriate side rails in place/Call bell, personal items and telephone in reach/Instruct patient to call for assistance before getting out of bed/chair/stretcher/Non-slip footwear applied when patient is off stretcher/[place] to call system/Physically safe environment - no spills, clutter or unnecessary equipment/Purposeful Proactive Rounding/Room/bathroom lighting operational, light cord in reach

## 2025-03-22 NOTE — ED ADULT NURSE REASSESSMENT NOTE - NS ED NURSE REASSESS COMMENT FT1
report taken from night RN. patient is resting comfortably in stretcher at this time with no complaints.  awaiting bed placement.

## 2025-03-22 NOTE — ED PROVIDER NOTE - OBJECTIVE STATEMENT
75 yo male PMHx CAD, CABG x 4 vessel, stent x 3, HTN, pre-DM, HLD, normal pressure hydrocephalus status post ventriculoperitoneal shunt in 2022, CVA 9/2024 with residual right hemiparesis Brought in by EMS with wife at bedside complaining of increased hallucinations altered mental status today.  Noted to have a rectal temp of 102.7.  As per wife patient has been coughing more.  Denies any chest pain shortness of breath.  Denies any urinary symptoms.  Denies any nausea vomiting diarrhea.  Denies any abdominal pain.

## 2025-03-22 NOTE — ED PROVIDER NOTE - CARE PLAN
1 Principal Discharge DX:	COVID  Secondary Diagnosis:	AMS (altered mental status)  Secondary Diagnosis:	Fever

## 2025-03-22 NOTE — ED PROVIDER NOTE - WR ORDER NAME 1
Dr. Diggs would like you to have a MRI and PET scan. We also will place a Genetic referral.         When you are in need of a refill, please call your pharmacy and they will send us a request.      Copy of appointments, and after visit summary (AVS) given to patient.      If you have any questions please call Doris Ballesteros RN, BSN Oncology Hematology  [facility] [phone]. For questions after business hours, or on holidays/weekends, please call our after hours Nurse Triage line [phone]. Thank you.       MRI and PET to be done.   Genetic counseling referral for FH of cancers.   
Xray Chest 1 View AP/PA

## 2025-03-22 NOTE — ED PROVIDER NOTE - CLINICAL SUMMARY MEDICAL DECISION MAKING FREE TEXT BOX
75 yo male PMHx CAD, CABG x 4 vessel, stent x 3, HTN, pre-DM, HLD, normal pressure hydrocephalus status post ventriculoperitoneal shunt in 2022, CVA 9/2024 with residual right hemiparesis Brought in by EMS with wife at bedside complaining of increased hallucinations altered mental status today.  Noted to have a rectal temp of 102.7.  As per wife patient has been coughing more.  Denies any chest pain shortness of breath.  Denies any urinary symptoms.  Denies any nausea vomiting diarrhea.  Denies any abdominal pain.    r/o sepsis, pna, viral etiology, labs, ekg, XR, IV fluids, antipyretics

## 2025-03-22 NOTE — ED PROVIDER NOTE - PHYSICAL EXAMINATION
Gen: AAOx2, NAD  Head/eyes: NC/AT, PERRL  ENT: airway patent  Neck: supple  Pulm/lung: Bilateral clear BS  CV/heart: RRR  GI/Abd: soft, NT/ND, +BS, no guarding/rebound tenderness  Musculoskeletal: no edema/erythema/cyanosis  Skin: no rash  Neuro: AAOx2, grossly intact

## 2025-03-22 NOTE — ED ADULT NURSE REASSESSMENT NOTE - NSFALLRISKINTERV_ED_ALL_ED
Assistance OOB with selected safe patient handling equipment if applicable/Assistance with ambulation/Communicate fall risk and risk factors to all staff, patient, and family/Monitor gait and stability/Provide patient with walking aids/Provide visual cue: yellow wristband, yellow gown, etc/Reinforce activity limits and safety measures with patient and family/Use of alarms - bed, stretcher, chair and/or video monitoring/Call bell, personal items and telephone in reach/Instruct patient to call for assistance before getting out of bed/chair/stretcher/Non-slip footwear applied when patient is off stretcher/[place] to call system/Physically safe environment - no spills, clutter or unnecessary equipment/Purposeful Proactive Rounding/Room/bathroom lighting operational, light cord in reach

## 2025-03-22 NOTE — CONSULT NOTE ADULT - ASSESSMENT
Mr Alicia is a 73yo M presenting with AMS found to have COVID. 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    AMS/TME  COVID  - CTH negative  - CXR clear    Recommendations:   C/w remdesivir x3 day course until 3/24  No indication for steroids at this time  No evidence of PNA on imaging, monitor off ABx  Trend temps/WBC  Monitor LFTs while on remdesivir  Supportive measures  Isolation per IC policy  Additional care per primary team    Patient evaluated with face-to-face time in addition to reviewing history, labs, microbiology, and imaging.   Antibiotic stewardship, local antibiogram, infection control strategies and potential transmission issues taken into consideration at time of treatment decision making process.   Thank you for allowing us to participate in the care of your patient.  Infectious Diseases will follow. Please call with any questions.  Janee Lockhart M.D.  Available on [employer] TEAMS -- *[facility] Infectious Diseases [phone]  For after 5 P.M. and weekends, please call [phone]

## 2025-03-23 LAB
A1C WITH ESTIMATED AVERAGE GLUCOSE RESULT: 6.6 % — HIGH (ref 4–5.6)
ALBUMIN SERPL ELPH-MCNC: 3.1 G/DL — LOW (ref 3.3–5)
ALP SERPL-CCNC: 46 U/L — SIGNIFICANT CHANGE UP (ref 40–120)
ALT FLD-CCNC: 19 U/L — SIGNIFICANT CHANGE UP (ref 12–78)
ANION GAP SERPL CALC-SCNC: 7 MMOL/L — SIGNIFICANT CHANGE UP (ref 5–17)
AST SERPL-CCNC: 9 U/L — LOW (ref 15–37)
BASOPHILS # BLD AUTO: 0.01 K/UL — SIGNIFICANT CHANGE UP (ref 0–0.2)
BASOPHILS NFR BLD AUTO: 0.2 % — SIGNIFICANT CHANGE UP (ref 0–2)
BILIRUB SERPL-MCNC: 0.5 MG/DL — SIGNIFICANT CHANGE UP (ref 0.2–1.2)
BUN SERPL-MCNC: 15 MG/DL — SIGNIFICANT CHANGE UP (ref 7–23)
CALCIUM SERPL-MCNC: 8.5 MG/DL — SIGNIFICANT CHANGE UP (ref 8.5–10.1)
CHLORIDE SERPL-SCNC: 106 MMOL/L — SIGNIFICANT CHANGE UP (ref 96–108)
CO2 SERPL-SCNC: 25 MMOL/L — SIGNIFICANT CHANGE UP (ref 22–31)
CREAT SERPL-MCNC: 0.56 MG/DL — SIGNIFICANT CHANGE UP (ref 0.5–1.3)
EGFR: 103 ML/MIN/1.73M2 — SIGNIFICANT CHANGE UP
EGFR: 103 ML/MIN/1.73M2 — SIGNIFICANT CHANGE UP
EOSINOPHIL # BLD AUTO: 0.04 K/UL — SIGNIFICANT CHANGE UP (ref 0–0.5)
EOSINOPHIL NFR BLD AUTO: 0.8 % — SIGNIFICANT CHANGE UP (ref 0–6)
ESTIMATED AVERAGE GLUCOSE: 143 MG/DL — HIGH (ref 68–114)
GLUCOSE SERPL-MCNC: 112 MG/DL — HIGH (ref 70–99)
HCT VFR BLD CALC: 36.1 % — LOW (ref 39–50)
HGB BLD-MCNC: 12.3 G/DL — LOW (ref 13–17)
IMM GRANULOCYTES NFR BLD AUTO: 0.2 % — SIGNIFICANT CHANGE UP (ref 0–0.9)
INR BLD: 1.1 RATIO — SIGNIFICANT CHANGE UP (ref 0.85–1.16)
LYMPHOCYTES # BLD AUTO: 1.07 K/UL — SIGNIFICANT CHANGE UP (ref 1–3.3)
LYMPHOCYTES # BLD AUTO: 20.3 % — SIGNIFICANT CHANGE UP (ref 13–44)
MCHC RBC-ENTMCNC: 30.1 PG — SIGNIFICANT CHANGE UP (ref 27–34)
MCHC RBC-ENTMCNC: 34.1 G/DL — SIGNIFICANT CHANGE UP (ref 32–36)
MCV RBC AUTO: 88.5 FL — SIGNIFICANT CHANGE UP (ref 80–100)
MONOCYTES # BLD AUTO: 1 K/UL — HIGH (ref 0–0.9)
MONOCYTES NFR BLD AUTO: 19 % — HIGH (ref 2–14)
NEUTROPHILS # BLD AUTO: 3.13 K/UL — SIGNIFICANT CHANGE UP (ref 1.8–7.4)
NEUTROPHILS NFR BLD AUTO: 59.5 % — SIGNIFICANT CHANGE UP (ref 43–77)
NRBC BLD AUTO-RTO: 0 /100 WBCS — SIGNIFICANT CHANGE UP (ref 0–0)
PLATELET # BLD AUTO: 146 K/UL — LOW (ref 150–400)
POTASSIUM SERPL-MCNC: 3.2 MMOL/L — LOW (ref 3.5–5.3)
POTASSIUM SERPL-SCNC: 3.2 MMOL/L — LOW (ref 3.5–5.3)
PROT SERPL-MCNC: 6.2 G/DL — SIGNIFICANT CHANGE UP (ref 6–8.3)
PROTHROM AB SERPL-ACNC: 12.9 SEC — SIGNIFICANT CHANGE UP (ref 9.9–13.4)
RBC # BLD: 4.08 M/UL — LOW (ref 4.2–5.8)
RBC # FLD: 15.3 % — HIGH (ref 10.3–14.5)
SODIUM SERPL-SCNC: 138 MMOL/L — SIGNIFICANT CHANGE UP (ref 135–145)
WBC # BLD: 5.26 K/UL — SIGNIFICANT CHANGE UP (ref 3.8–10.5)
WBC # FLD AUTO: 5.26 K/UL — SIGNIFICANT CHANGE UP (ref 3.8–10.5)

## 2025-03-23 PROCEDURE — 99233 SBSQ HOSP IP/OBS HIGH 50: CPT

## 2025-03-23 PROCEDURE — 99222 1ST HOSP IP/OBS MODERATE 55: CPT

## 2025-03-23 RX ORDER — HYPROMELLOSE 0.4 %
1 DROPS OPHTHALMIC (EYE) EVERY 4 HOURS
Refills: 0 | Status: DISCONTINUED | OUTPATIENT
Start: 2025-03-23 | End: 2025-03-25

## 2025-03-23 RX ORDER — CLONAZEPAM 0.5 MG/1
0.25 TABLET ORAL DAILY
Refills: 0 | Status: DISCONTINUED | OUTPATIENT
Start: 2025-03-23 | End: 2025-03-25

## 2025-03-23 RX ADMIN — REMDESIVIR 200 MILLIGRAM(S): 5 INJECTION INTRAVENOUS at 10:26

## 2025-03-23 RX ADMIN — Medication 500 MILLIGRAM(S): at 12:16

## 2025-03-23 RX ADMIN — LOSARTAN POTASSIUM 50 MILLIGRAM(S): 100 TABLET, FILM COATED ORAL at 05:44

## 2025-03-23 RX ADMIN — Medication 40 MILLIEQUIVALENT(S): at 14:48

## 2025-03-23 RX ADMIN — Medication 650 MILLIGRAM(S): at 05:44

## 2025-03-23 RX ADMIN — Medication 40 MILLIGRAM(S): at 05:45

## 2025-03-23 RX ADMIN — HEPARIN SODIUM 5000 UNIT(S): 1000 INJECTION INTRAVENOUS; SUBCUTANEOUS at 22:01

## 2025-03-23 RX ADMIN — Medication 400 MILLIGRAM(S): at 12:16

## 2025-03-23 RX ADMIN — PAROXETINE HYDROCHLORIDE 20 MILLIGRAM(S): 20 TABLET, FILM COATED ORAL at 12:16

## 2025-03-23 RX ADMIN — ROSUVASTATIN CALCIUM 40 MILLIGRAM(S): 5 TABLET, FILM COATED ORAL at 22:01

## 2025-03-23 RX ADMIN — HEPARIN SODIUM 5000 UNIT(S): 1000 INJECTION INTRAVENOUS; SUBCUTANEOUS at 05:45

## 2025-03-23 RX ADMIN — TAMSULOSIN HYDROCHLORIDE 0.4 MILLIGRAM(S): 0.4 CAPSULE ORAL at 22:01

## 2025-03-23 RX ADMIN — Medication 1 DROP(S): at 17:25

## 2025-03-23 RX ADMIN — CLOPIDOGREL BISULFATE 75 MILLIGRAM(S): 75 TABLET, FILM COATED ORAL at 12:16

## 2025-03-23 RX ADMIN — Medication 40 MILLIEQUIVALENT(S): at 17:25

## 2025-03-23 RX ADMIN — Medication 650 MILLIGRAM(S): at 17:24

## 2025-03-23 RX ADMIN — HEPARIN SODIUM 5000 UNIT(S): 1000 INJECTION INTRAVENOUS; SUBCUTANEOUS at 14:48

## 2025-03-23 RX ADMIN — INSULIN LISPRO 1: 100 INJECTION, SOLUTION INTRAVENOUS; SUBCUTANEOUS at 12:15

## 2025-03-23 RX ADMIN — METOPROLOL SUCCINATE 100 MILLIGRAM(S): 50 TABLET, EXTENDED RELEASE ORAL at 05:44

## 2025-03-23 NOTE — CONSULT NOTE ADULT - NS ATTEND AMEND GEN_ALL_CORE FT
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 Cardiology called for ? [facility] fib. [facility]     - Pt p/w confusion, found to have COVID.  - Remains on RA, on remdesivir.     - Tele showed SR freq PACs  - No A fib identified on tele review   - [employer] ILR ot be interrogated in AM    - Known CAD s/p CABG  - EKG showed SR PAC @ 66  - No evidence of any active ischemia   - Continue Plavix and statin   - Continue Toprol   - Decrease Losartan in the setting of low diasotlic BPs  - Routine TTE

## 2025-03-23 NOTE — CONSULT NOTE ADULT - ASSESSMENT
73yo M presenting with AMS found to have COVID. 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     covid  weakness  viral syndrome  episodic hypoxemia -   cad  CABG hx  HTN  DM  HLD  NPH - BP shunt  CVA hx    remdesivir  monitor for hypoxemia and the need for Decadron  consideration for D dimer check if pt cont to drop sats   dvt p  on antiplatelet rx   I perla  isol precs  cough rx regimen  cvs rx regimen  BP control  DM care  monitor mentation and alertness  nutrition

## 2025-03-23 NOTE — CONSULT NOTE ADULT - ASSESSMENT
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 Cardiology called for ? A fib     - Pt p/w confusion, found to have COVID.  - ID following   - Management per primary team     - Tele showed SR freq PAC, occasional PVC 60-80s  - No A fib identified on tele review   - Can interrogate ILR in am     - Known CAD s/p CABG  - EKG showed SR PAC @ 66  - No evidence of any active ischemia   - Continue Plavix and statin   - Continue Toprol and losartan    - No evidence of any meaningful volume overload    - BP stable and controlled     - Monitor and replete lytes, keep K>4, Mg>2.  - Will continue to follow.    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 Cardiology called for ? [facility] fib. [facility]     - Pt p/w confusion, found to have COVID.  - ID following   - Management per primary team     - Tele showed SR freq PAC, occasional PVC 60-80s  - No A fib identified on tele review   - Can interrogate ILR in am     - Known CAD s/p CABG  - EKG showed SR PAC @ 66  - No evidence of any active ischemia   - Continue Plavix and statin   - Continue Toprol and losartan    - No evidence of any meaningful volume overload    - BP stable and controlled     - Monitor and replete lytes, keep K>4, Mg>2.  - Will continue to follow.    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 Cardiology called for ? [facility] fib[facility]     - Pt p/w confusion, found to have COVID.  - ID following   - Management per primary team     - Tele showed SR freq PAC, occasional PVC 60-80s  - No A fib identified on tele review   - Can interrogate ILR in am     - Known CAD s/p CABG  - EKG showed SR PAC @ 66  - No evidence of any active ischemia   - Continue Plavix and statin   - Continue Toprol  - Decrease Losartan to 50mg daily    - No evidence of any meaningful volume overload    - BP stable and controlled     - Monitor and replete lytes, keep K>4, Mg>2.  - Will continue to follow.    Glen Cervantes, MS FNP, AGACNP  Nurse Practitioner- Cardiology   Please call on TEAMS

## 2025-03-23 NOTE — DISCHARGE NOTE PROVIDER - NSDCCPCAREPLAN_GEN_ALL_CORE_FT
PRINCIPAL DISCHARGE DIAGNOSIS  Diagnosis: COVID  Assessment and Plan of Treatment: You were given a course of Remdesivir.  Your oxygen levels were monitored.  You have not required supplemental oxygen support.  Please follow up with your PMD in 1 week.      SECONDARY DISCHARGE DIAGNOSES  Diagnosis: AMS (altered mental status)  Assessment and Plan of Treatment: Most likely related to COVID infection.  Mental status has improved.    Diagnosis: Fever  Assessment and Plan of Treatment: secondary to COVID infection.  Fevers have resolved.

## 2025-03-23 NOTE — DISCHARGE NOTE PROVIDER - NSDCMRMEDTOKEN_GEN_ALL_CORE_FT
acetaminophen 325 mg oral tablet: 2 tab(s) orally every 6 hours as needed for  pain  ascorbic acid 500 mg oral tablet: 1 tab(s) orally once a day  clonazePAM 0.5 mg oral tablet: 1 tab(s) orally 3 times a day as needed for  anxiety  clopidogrel 75 mg oral tablet: 1 tab(s) orally once a day  colesevelam 625 mg oral tablet: 1 tab(s) orally 2 times a day (with meals)  dapagliflozin 5 mg oral tablet: 1 tab(s) orally once a day  losartan 50 mg oral tablet: 1 orally once a day  magnesium oxide: 400 milligram(s) orally once a day (with meals)  melatonin 3 mg oral tablet: 1 tab(s) orally once a day (at bedtime) as needed for  sleep  metoprolol succinate 100 mg oral tablet, extended release: 1 orally once a day  nystatin 100,000 units/g topical powder: Apply topically to affected area 3 times a day as needed for  rash  ocular lubricant: 1 drop(s) to each affected eye as needed for  dry eyes  ondansetron 4 mg oral tablet: 1 tab(s) orally every 8 hours as needed for  nausea  pantoprazole 40 mg oral delayed release tablet: 1 tab(s) orally once a day before breakfast  PARoxetine 20 mg oral tablet: 1 tab(s) orally once a day (at bedtime)  Polyethylene Glycol 3350: 17 gram(s) orally once a day as needed for  constipation  Probiotic Formula (Bacillus Coagulans) oral capsule: 1 orally once a day  rosuvastatin 40 mg oral tablet: 1 orally once a day (at bedtime)  simethicone 80 mg oral tablet, chewable: 1 tab(s) chewed 4 times a day (after meals and at bedtime) as needed for  gas  tamsulosin 0.4 mg oral capsule: 1 cap(s) orally once a day  Vascepa 1 g oral capsule: 2 cap(s) orally 2 times a day (with meals)   acetaminophen 325 mg oral tablet: 2 tab(s) orally every 6 hours as needed for  pain  ascorbic acid 500 mg oral tablet: 1 tab(s) orally once a day  clonazePAM 0.5 mg oral tablet: 1 tab(s) orally 3 times a day as needed for  anxiety  clopidogrel 75 mg oral tablet: 1 tab(s) orally once a day  colesevelam 625 mg oral tablet: 1 tab(s) orally 2 times a day (with meals)  dapagliflozin 5 mg oral tablet: 1 tab(s) orally once a day  losartan 50 mg oral tablet: 1 orally once a day  magnesium oxide: 400 milligram(s) orally once a day (with meals)  melatonin 3 mg oral tablet: 1 tab(s) orally once a day (at bedtime) as needed for  sleep  metoprolol succinate 100 mg oral tablet, extended release: 1 orally once a day  nystatin 100,000 units/g topical powder: Apply topically to affected area 3 times a day as needed for  rash  ocular lubricant: 1 drop(s) to each affected eye every 4 hours as needed for  dry eyes  ondansetron 4 mg oral tablet: 1 tab(s) orally every 8 hours as needed for  nausea  pantoprazole 40 mg oral delayed release tablet: 1 tab(s) orally once a day before breakfast  PARoxetine 20 mg oral tablet: 1 tab(s) orally once a day (at bedtime)  Polyethylene Glycol 3350: 17 gram(s) orally once a day as needed for  constipation  Probiotic Formula (Bacillus Coagulans) oral capsule: 1 orally once a day  rosuvastatin 40 mg oral tablet: 1 orally once a day (at bedtime)  simethicone 80 mg oral tablet, chewable: 1 tab(s) chewed 4 times a day (after meals and at bedtime) as needed for  gas  tamsulosin 0.4 mg oral capsule: 1 cap(s) orally once a day  Vascepa 1 g oral capsule: 2 cap(s) orally 2 times a day (with meals)   acetaminophen 325 mg oral tablet: 2 tab(s) orally every 6 hours as needed for  pain  ascorbic acid 500 mg oral tablet: 1 tab(s) orally once a day  clonazePAM 0.5 mg oral tablet: 1 tab(s) orally 3 times a day as needed for  anxiety  clopidogrel 75 mg oral tablet: 1 tab(s) orally once a day  colesevelam 625 mg oral tablet: 1 tab(s) orally 2 times a day (with meals)  dapagliflozin 5 mg oral tablet: 1 tab(s) orally once a day  Home physical therapy: Evaluation and treatment  losartan 50 mg oral tablet: 1 orally once a day  magnesium oxide: 400 milligram(s) orally once a day (with meals)  melatonin 3 mg oral tablet: 1 tab(s) orally once a day (at bedtime) as needed for  sleep  metoprolol succinate 100 mg oral tablet, extended release: 1 orally once a day  nystatin 100,000 units/g topical powder: Apply topically to affected area 3 times a day as needed for  rash  ocular lubricant: 1 drop(s) to each affected eye every 4 hours as needed for  dry eyes  ondansetron 4 mg oral tablet: 1 tab(s) orally every 8 hours as needed for  nausea  pantoprazole 40 mg oral delayed release tablet: 1 tab(s) orally once a day before breakfast  PARoxetine 20 mg oral tablet: 1 tab(s) orally once a day (at bedtime)  Polyethylene Glycol 3350: 17 gram(s) orally once a day as needed for  constipation  Probiotic Formula (Bacillus Coagulans) oral capsule: 1 orally once a day  rosuvastatin 40 mg oral tablet: 1 orally once a day (at bedtime)  simethicone 80 mg oral tablet, chewable: 1 tab(s) chewed 4 times a day (after meals and at bedtime) as needed for  gas  tamsulosin 0.4 mg oral capsule: 1 cap(s) orally once a day  Vascepa 1 g oral capsule: 2 cap(s) orally 2 times a day (with meals)

## 2025-03-23 NOTE — DISCHARGE NOTE PROVIDER - ATTENDING DISCHARGE PHYSICAL EXAMINATION:
Vitals: T: 97.6  P: 59  BP: 120/68  RR: 18  SpO2: 94% RA  GENERAL: NAD  HEENT: EOMI, hearing normal, conjunctiva and sclera clear  Chest: CTA bilaterally, no wheezing  CV: S1S2, RRR,   GI: soft, +BS, NT/ND  Musculoskeletal: no LE edema  Psychiatric: affect nL, mood nL  Skin: warm and dry

## 2025-03-23 NOTE — CONSULT NOTE ADULT - SUBJECTIVE AND OBJECTIVE BOX
Date/Time Patient Seen:  		  Referring MD:   Data Reviewed	       Patient is a 74y old  Male who presents with a chief complaint of COVID, AMS (23 Mar 2025 07:40)      Subjective/HPI    History of Present Illness:  Reason for Admission: COVID, AMS  History of Present Illness:   Mr Vargas is a 75yo M presenting with AMS found to have COVID. 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   Patient brought in by EMS due to patients wife reporting that patient has been more confused and hallucinating since yesterday evening. Patient is A and O x2 currently, he reports a cough, without any associated SOB or chest pain or palpitations. He had a fever of 102.7F. He is tolerating PO intake. Denies headaches, nausea, vomiting, chest pain, SOB, abdominal pain, constipation, diarrhea, melena, hematochezia, dysuria.     In ER patient found to have COVID.          Review of Systems:  Unable to obtain due to: Altered mentation      Allergies and Intolerances:        Allergies:  	tramadol: Drug, Rash  	tramadol: Drug, Chills, Nausea  	codeine: Drug, Other, confusion  	codeine: Drug, Vomiting, Nausea    Home Medications:   * Incomplete Medication History as of 03-Sep-2024 16:32 documented in Structured Notes  · 	Tylenol 500 mg oral tablet: 2 orally  · 	CoQ10 100 mg oral capsule: 2 cap(s) orally once a day  · 	Vascepa 1 g oral capsule: 1 cap(s) orally once a day  · 	metoprolol succinate 100 mg oral tablet, extended release: 1 orally once a day  · 	aspirin 81 mg oral tablet: 1 tab(s) orally once a day  · 	omeprazole 40 mg oral delayed release capsule: 1 orally once a day  · 	colestipol 1 g oral tablet: 2 tab(s) orally 2 times a day  · 	losartan 50 mg oral tablet: 1 orally once a day  · 	tamsulosin 0.4 mg oral capsule: 1 cap(s) orally once a day  · 	colesevelam 625 mg oral tablet: 1 orally 2 times a day  · 	rosuvastatin 40 mg oral tablet: 1 orally once a day (at bedtime)  · 	Viibryd 10 mg oral tablet: 1 orally once a day  · 	Invokana 100 mg oral tablet: 1 tab(s) orally once a day  · 	Probiotic Formula (Bacillus Coagulans) oral capsule: 1 orally once a day  · 	Vitamin D3 1,000 iu capsules  3 capsules daily:     Patient History:    Past Medical, Past Surgical, and Family History:  PAST MEDICAL HISTORY:  2019 novel coronavirus disease (COVID-19)     CAD (coronary artery disease)     GERD (gastroesophageal reflux disease)     H/O: depression mild    Hydrocephalus     Hyperlipidemia     Hypertension     Type 2 diabetes mellitus prediabetes.     PAST SURGICAL HISTORY:  H/O melanoma excision     S/P bilateral inguinal hernia repair     S/P cervical spinal fusion no hardware    S/P quadruple vessel bypass     S/P  shunt     Stented coronary artery 3 stents.     FAMILY HISTORY:  No pertinent family history in first degree relatives. No pertinent family history of: VTE.     Social History:  · Substance use	No     Tobacco Screening:  · Core Measure Site	Yes  · Has the patient used tobacco in the past 30 days?	No    Risk Assessment:    Present on Admission:  Deep Venous Thrombosis	no  Pulmonary Embolus	no     HIV Screening:  · In accordance with [facility] law, we offer every patient who comes to our ED an HIV test. Would you like to be tested today?	Opt out     Hepatitis C Test Questions:  · In accordance with [place] [employer] Law, we offer every patient a Hepatitis C test. Would you like to be tested today?	Opt out    PAST MEDICAL & SURGICAL HISTORY:  Hypertension    CAD (coronary artery disease)    Hyperlipidemia    Type 2 diabetes mellitus  prediabetes    H/O: depression  mild    GERD (gastroesophageal reflux disease)    Hydrocephalus    2019 novel coronavirus disease (COVID-19)    S/P  shunt    Stented coronary artery  3 stents    S/P bilateral inguinal hernia repair    S/P quadruple vessel bypass    S/P cervical spinal fusion  no hardware    H/O melanoma excision          Medication list         MEDICATIONS  (STANDING):  ascorbic acid 500 milliGRAM(s) Oral daily  clopidogrel Tablet 75 milliGRAM(s) Oral daily  dextrose 5%. 1000 milliLiter(s) (50 mL/Hr) IV Continuous <Continuous>  dextrose 5%. 1000 milliLiter(s) (100 mL/Hr) IV Continuous <Continuous>  dextrose 50% Injectable 25 Gram(s) IV Push once  dextrose 50% Injectable 12.5 Gram(s) IV Push once  dextrose 50% Injectable 25 Gram(s) IV Push once  glucagon  Injectable 1 milliGRAM(s) IntraMuscular once  heparin   Injectable 5000 Unit(s) SubCutaneous every 8 hours  insulin lispro (ADMELOG) corrective regimen sliding scale   SubCutaneous three times a day before meals  insulin lispro (ADMELOG) corrective regimen sliding scale   SubCutaneous at bedtime  losartan 50 milliGRAM(s) Oral daily  magnesium oxide 400 milliGRAM(s) Oral daily  metoprolol succinate  milliGRAM(s) Oral daily  pantoprazole    Tablet 40 milliGRAM(s) Oral before breakfast  PARoxetine 20 milliGRAM(s) Oral daily  remdesivir  IVPB 100 milliGRAM(s) IV Intermittent every 24 hours  remdesivir  IVPB   IV Intermittent   rosuvastatin 40 milliGRAM(s) Oral at bedtime  tamsulosin 0.4 milliGRAM(s) Oral at bedtime    MEDICATIONS  (PRN):  acetaminophen     Tablet .. 650 milliGRAM(s) Oral every 6 hours PRN Temp greater or equal to 38C (100.4F), Mild Pain (1 - 3)  dextrose Oral Gel 15 Gram(s) Oral once PRN Blood Glucose LESS THAN 70 milliGRAM(s)/deciliter  melatonin 3 milliGRAM(s) Oral at bedtime PRN for sleep         Vitals log        ICU Vital Signs Last 24 Hrs  T(C): 36.7 (23 Mar 2025 04:30), Max: 37.1 (22 Mar 2025 11:00)  T(F): 98.1 (23 Mar 2025 04:30), Max: 98.8 (22 Mar 2025 11:00)  HR: 70 (23 Mar 2025 04:30) (65 - 72)  BP: 125/54 (23 Mar 2025 04:30) (112/62 - 139/47)  BP(mean): --  ABP: --  ABP(mean): --  RR: 18 (23 Mar 2025 04:30) (17 - 18)  SpO2: 96% (23 Mar 2025 04:30) (96% - 99%)    O2 Parameters below as of 23 Mar 2025 04:30  Patient On (Oxygen Delivery Method): room air                 Input and Output:  I&O's Detail    22 Mar 2025 07:01  -  23 Mar 2025 07:00  --------------------------------------------------------  IN:  Total IN: 0 mL    OUT:    Voided (mL): 500 mL  Total OUT: 500 mL    Total NET: -500 mL          Lab Data                        13.7   9.84  )-----------( 173      ( 22 Mar 2025 04:55 )             40.2     03-22    138  |  106  |  20  ----------------------------<  141[H]  4.0   |  26  |  0.79    Ca    8.8      22 Mar 2025 04:55    TPro  6.9  /  Alb  3.5  /  TBili  0.7  /  DBili  x   /  AST  18  /  ALT  21  /  AlkPhos  78  03-22            Review of Systems	  alert  verbal  on RA  s/p AMS    Objective     Physical Examination    heart s1s2  lung dc BS  head nc  head at  abd soft  cn grossly int  on RA      Pertinent Lab findings & Imaging      Rucker:  NO   Adequate UO     I&O's Detail    22 Mar 2025 07:01  -  23 Mar 2025 07:00  --------------------------------------------------------  IN:  Total IN: 0 mL    OUT:    Voided (mL): 500 mL  Total OUT: 500 mL    Total NET: -500 mL               Discussed with:     Cultures:	        Radiology      ACC: [id number] EXAM:  XR CHEST AP OR PA 1V   ORDERED BY: VALERIE TORRES     PROCEDURE DATE:  03/22/2025          INTERPRETATION:  History: Cough and fever    Technique: Single frontal view of the chest    Comparison: 9/3/2024    Findings:    Lungs: Clear.    Heart/Mediastinum: Again noted to be status post CABG. New loop recorder.   Heart size is stable.    Osseous structures: Sternotomy wires again present. Degenerative changes   of both shoulders.    Impression:    New loop recorder. No acute pulmonary disease.    --- End of Report ---            ESTRELLA HOLDER MD; Attending Radiologist  This document has been electronically signed. Mar 22 2025 11:06AM

## 2025-03-23 NOTE — DISCHARGE NOTE PROVIDER - HOSPITAL COURSE
Mr Vargas is a 73yo M presenting with AMS found to have COVID. 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   Patient brought in by EMS due to patients wife reporting that patient has been more confused and hallucinating since yesterday evening. Patient is A and O x2 currently, he reports a cough, without any associated SOB or chest pain or palpitations. He had a fever of 102.7F. He is tolerating PO intake. Denies headaches, nausea, vomiting, chest pain, SOB, abdominal pain, constipation, diarrhea, melena, hematochezia, dysuria.     In ER patient found to have COVID.   CXR: new loop recorder.  No acute pulmonary disease.   CT head:  stable examination.     Pt. was admitted with ID, Pulmonary, and Cardiology consultation.  He was started on Remdesivir.  Pt. has remained on room air with no need for initiation of steroids. Mr Vargas is a 73yo M presenting with AMS found to have COVID. 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   Patient brought in by EMS due to patients wife reporting that patient has been more confused and hallucinating since yesterday evening. Patient is A and O x2 currently, he reports a cough, without any associated SOB or chest pain or palpitations. He had a fever of 102.7F. He is tolerating PO intake. Denies headaches, nausea, vomiting, chest pain, SOB, abdominal pain, constipation, diarrhea, melena, hematochezia, dysuria.     In ER patient found to have COVID.   CXR: new loop recorder.  No acute pulmonary disease.   CT head:  stable examination.     Pt. was admitted with ID, Pulmonary, and Cardiology consultation.  He was started on Remdesivir.  Pt. has remained on room air with no need for initiation of steroids.  He has completed 3 doses of Remdesivir.  Pt. also had is ILR interrogated showing NSR.  Pt. was assessed by physical therapy and recommended discharge home with home physical therapy.    On day of discharge patient is in no distress.  Afebrile and hemodynamically stable.  On room air with SpO2 in 90th%.

## 2025-03-23 NOTE — CHART NOTE - NSCHARTNOTEFT_GEN_A_CORE
Called by RN for tele monitoring showing desats of 86-87% when patient is asleep. When patient is awake, his saturation resumes back to the 90s.   Desats 2/2 to COVID. Patient does not appear to have hx of COPD, nor is he on home O2.   Ordered nasal cannula 2L for now.  RN to call for any changes.

## 2025-03-23 NOTE — DISCHARGE NOTE PROVIDER - CARE PROVIDER_API CALL
Soy Temple  Internal Medicine  [address] [facility], [place] [address]  Phone: [phone]  Fax: [phone]  Follow Up Time: 1 week

## 2025-03-23 NOTE — CONSULT NOTE ADULT - SUBJECTIVE AND OBJECTIVE BOX
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 Patient is A and O x2 currently, he reports a cough, without any associated SOB or chest pain or palpitations. Denies headaches, nausea, vomiting, chest pain, SOB, abdominal pain, constipation, diarrhea, melena, hematochezia, dysuria. Cardiology called for ? A fib     Allergies  codeine (Vomiting; Nausea)  tramadol (Chills; Nausea)  codeine (Other)  tramadol (Rash)    Intolerances      PAST MEDICAL & SURGICAL HISTORY:  Hypertension      Hypertension      CAD (coronary artery disease)      Hyperlipidemia      Type 2 diabetes mellitus  prediabetes      H/O: depression  mild      GERD (gastroesophageal reflux disease)      Hydrocephalus      2019 novel coronavirus disease (COVID-19)      S/P  shunt      Stented coronary artery  3 stents      S/P bilateral inguinal hernia repair      S/P quadruple vessel bypass      S/P cervical spinal fusion  no hardware      H/O melanoma excision        MEDICATIONS  (STANDING):  ascorbic acid 500 milliGRAM(s) Oral daily  clopidogrel Tablet 75 milliGRAM(s) Oral daily  dextrose 5%. 1000 milliLiter(s) (50 mL/Hr) IV Continuous <Continuous>  dextrose 5%. 1000 milliLiter(s) (100 mL/Hr) IV Continuous <Continuous>  dextrose 50% Injectable 25 Gram(s) IV Push once  dextrose 50% Injectable 12.5 Gram(s) IV Push once  dextrose 50% Injectable 25 Gram(s) IV Push once  glucagon  Injectable 1 milliGRAM(s) IntraMuscular once  heparin   Injectable 5000 Unit(s) SubCutaneous every 8 hours  insulin lispro (ADMELOG) corrective regimen sliding scale   SubCutaneous three times a day before meals  insulin lispro (ADMELOG) corrective regimen sliding scale   SubCutaneous at bedtime  losartan 50 milliGRAM(s) Oral daily  magnesium oxide 400 milliGRAM(s) Oral daily  metoprolol succinate  milliGRAM(s) Oral daily  pantoprazole    Tablet 40 milliGRAM(s) Oral before breakfast  PARoxetine 20 milliGRAM(s) Oral daily  remdesivir  IVPB 100 milliGRAM(s) IV Intermittent every 24 hours  remdesivir  IVPB   IV Intermittent   rosuvastatin 40 milliGRAM(s) Oral at bedtime  tamsulosin 0.4 milliGRAM(s) Oral at bedtime    MEDICATIONS  (PRN):  acetaminophen     Tablet .. 650 milliGRAM(s) Oral every 6 hours PRN Temp greater or equal to 38C (100.4F), Mild Pain (1 - 3)  dextrose Oral Gel 15 Gram(s) Oral once PRN Blood Glucose LESS THAN 70 milliGRAM(s)/deciliter  melatonin 3 milliGRAM(s) Oral at bedtime PRN for sleep    FAMILY HISTORY:  No pertinent family history in first degree relatives       No family history of acute MI or sudden cardiac death.    SOCIAL HISTORY: No active tobacco, ethanol, or drug abuse.    REVIEW OF SYSTEMS   All other review of systems is negative unless indicated above.    VITAL SIGNS:   Vital Signs Last 24 Hrs  T(C): 36.7 (23 Mar 2025 04:30), Max: 37.1 (22 Mar 2025 11:00)  T(F): 98.1 (23 Mar 2025 04:30), Max: 98.8 (22 Mar 2025 11:00)  HR: 70 (23 Mar 2025 04:30) (65 - 72)  BP: 125/54 (23 Mar 2025 04:30) (112/62 - 139/47)  BP(mean): --  RR: 18 (23 Mar 2025 04:30) (17 - 18)  SpO2: 96% (23 Mar 2025 04:30) (96% - 99%)    Parameters below as of 23 Mar 2025 04:30  Patient On (Oxygen Delivery Method): room air        Physical Exam:  Constitutional: NAD, awake and alert  HEENT: Moist Mucous Membranes, Anicteric  Pulmonary: Non-labored, breath sounds are clear bilaterally, Diminished at bases No wheezing, rales or rhonchi  Cardiovascular: Regular, S1 and S2, No murmurs, No rubs, gallops or clicks  Gastrointestinal: Bowel Sounds present, soft, nontender.   Lymph: No peripheral edema. No lymphadenopathy.  Skin: No visible rashes or ulcers.  Psych:  Mood & affect appropriate    I&O's Summary    22 Mar 2025 07:01  -  23 Mar 2025 07:00  --------------------------------------------------------  IN: 0 mL / OUT: 500 mL / NET: -500 mL        LABS: All Labs Reviewed:                        13.7   9.84  )-----------( 173      ( 22 Mar 2025 04:55 )             40.2     22 Mar 2025 04:55    138    |  106    |  20     ----------------------------<  141    4.0     |  26     |  0.79     Ca    8.8        22 Mar 2025 04:55    TPro  6.9    /  Alb  3.5    /  TBili  0.7    /  DBili  x      /  AST  18     /  ALT  21     /  AlkPhos  78     22 Mar 2025 04:55    PT/INR - ( 22 Mar 2025 04:55 )   PT: 12.2 sec;   INR: 1.03 ratio         PTT - ( 22 Mar 2025 04:55 )  PTT:22.2 sec    12 Lead ECG:   Ventricular Rate 74 BPM    Atrial Rate 74 BPM    P-R Interval 166 ms    QRS Duration 94 ms    Q-T Interval 404 ms    QTC Calculation(Bazett) 448 ms    P Axis 107 degrees    R Axis -29 degrees    T Axis 90 degrees    Diagnosis Line Normal sinus rhythm with sinus arrhythmia  Nonspecific ST and T wave abnormality  Abnormal ECG  No previous ECGs available  Confirmed by Ashlee Silverman ([id number]) on 3/22/2025 7:11:35 AM (03-22-25 @ 04:21)     [facility] - Wang Sepnce, Vish, Sheldon, Abena, Devan, Herrera; Office Number: [phone]    Initial Consult Note  CHIEF COMPLAINT: Patient is a 74y old  Male who presents with a chief complaint of COVID, AMS (22 Mar 2025 10:24)    HPI: 74M PMH CAD, CABG x 4 vessel, stent x 3, HTN, DM2, HLD, normal pressure hydrocephalus status post ventriculoperitoneal shunt in 2022, 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 Patient is A and O x2 currently, he reports a cough, without any associated SOB or chest pain or palpitations. Denies headaches, nausea, vomiting, chest pain, SOB, abdominal pain, constipation, diarrhea, melena, hematochezia, dysuria. Cardiology called for ? A fib. Follows [facility] for cardiology.     Allergies  codeine (Vomiting; Nausea)  tramadol (Chills; Nausea)  codeine (Other)  tramadol (Rash)    Intolerances      PAST MEDICAL & SURGICAL HISTORY:  Hypertension      Hypertension      CAD (coronary artery disease)      Hyperlipidemia      Type 2 diabetes mellitus  prediabetes      H/O: depression  mild      GERD (gastroesophageal reflux disease)      Hydrocephalus      2019 novel coronavirus disease (COVID-19)      S/P  shunt      Stented coronary artery  3 stents      S/P bilateral inguinal hernia repair      S/P quadruple vessel bypass      S/P cervical spinal fusion  no hardware      H/O melanoma excision        MEDICATIONS  (STANDING):  ascorbic acid 500 milliGRAM(s) Oral daily  clopidogrel Tablet 75 milliGRAM(s) Oral daily  dextrose 5%. 1000 milliLiter(s) (50 mL/Hr) IV Continuous <Continuous>  dextrose 5%. 1000 milliLiter(s) (100 mL/Hr) IV Continuous <Continuous>  dextrose 50% Injectable 25 Gram(s) IV Push once  dextrose 50% Injectable 12.5 Gram(s) IV Push once  dextrose 50% Injectable 25 Gram(s) IV Push once  glucagon  Injectable 1 milliGRAM(s) IntraMuscular once  heparin   Injectable 5000 Unit(s) SubCutaneous every 8 hours  insulin lispro (ADMELOG) corrective regimen sliding scale   SubCutaneous three times a day before meals  insulin lispro (ADMELOG) corrective regimen sliding scale   SubCutaneous at bedtime  losartan 50 milliGRAM(s) Oral daily  magnesium oxide 400 milliGRAM(s) Oral daily  metoprolol succinate  milliGRAM(s) Oral daily  pantoprazole    Tablet 40 milliGRAM(s) Oral before breakfast  PARoxetine 20 milliGRAM(s) Oral daily  remdesivir  IVPB 100 milliGRAM(s) IV Intermittent every 24 hours  remdesivir  IVPB   IV Intermittent   rosuvastatin 40 milliGRAM(s) Oral at bedtime  tamsulosin 0.4 milliGRAM(s) Oral at bedtime    MEDICATIONS  (PRN):  acetaminophen     Tablet .. 650 milliGRAM(s) Oral every 6 hours PRN Temp greater or equal to 38C (100.4F), Mild Pain (1 - 3)  dextrose Oral Gel 15 Gram(s) Oral once PRN Blood Glucose LESS THAN 70 milliGRAM(s)/deciliter  melatonin 3 milliGRAM(s) Oral at bedtime PRN for sleep    FAMILY HISTORY:  No pertinent family history in first degree relatives       No family history of acute MI or sudden cardiac death.    SOCIAL HISTORY: No active tobacco, ethanol, or drug abuse.    REVIEW OF SYSTEMS   All other review of systems is negative unless indicated above.    VITAL SIGNS:   Vital Signs Last 24 Hrs  T(C): 36.7 (23 Mar 2025 04:30), Max: 37.1 (22 Mar 2025 11:00)  T(F): 98.1 (23 Mar 2025 04:30), Max: 98.8 (22 Mar 2025 11:00)  HR: 70 (23 Mar 2025 04:30) (65 - 72)  BP: 125/54 (23 Mar 2025 04:30) (112/62 - 139/47)  BP(mean): --  RR: 18 (23 Mar 2025 04:30) (17 - 18)  SpO2: 96% (23 Mar 2025 04:30) (96% - 99%)    Parameters below as of 23 Mar 2025 04:30  Patient On (Oxygen Delivery Method): room air        Physical Exam:  Constitutional: NAD, awake and alert  HEENT: Moist Mucous Membranes, Anicteric  Pulmonary: Non-labored, breath sounds are clear bilaterally, Diminished at bases No wheezing, rales or rhonchi  Cardiovascular: Regular, S1 and S2, No murmurs, No rubs, gallops or clicks  Gastrointestinal: Bowel Sounds present, soft, nontender.   Lymph: No peripheral edema. No lymphadenopathy.  Skin: No visible rashes or ulcers.  Psych:  Mood & affect appropriate    I&O's Summary    22 Mar 2025 07:01  -  23 Mar 2025 07:00  --------------------------------------------------------  IN: 0 mL / OUT: 500 mL / NET: -500 mL        LABS: All Labs Reviewed:                        13.7   9.84  )-----------( 173      ( 22 Mar 2025 04:55 )             40.2     22 Mar 2025 04:55    138    |  106    |  20     ----------------------------<  141    4.0     |  26     |  0.79     Ca    8.8        22 Mar 2025 04:55    TPro  6.9    /  Alb  3.5    /  TBili  0.7    /  DBili  x      /  AST  18     /  ALT  21     /  AlkPhos  78     22 Mar 2025 04:55    PT/INR - ( 22 Mar 2025 04:55 )   PT: 12.2 sec;   INR: 1.03 ratio         PTT - ( 22 Mar 2025 04:55 )  PTT:22.2 sec    12 Lead ECG:   Ventricular Rate 74 BPM    Atrial Rate 74 BPM    P-R Interval 166 ms    QRS Duration 94 ms    Q-T Interval 404 ms    QTC Calculation(Bazett) 448 ms    P Axis 107 degrees    R Axis -29 degrees    T Axis 90 degrees    Diagnosis Line Normal sinus rhythm with sinus arrhythmia  Nonspecific ST and T wave abnormality  Abnormal ECG  No previous ECGs available  Confirmed by Ashlee Silverman ([id number]) on 3/22/2025 7:11:35 AM (03-22-25 @ 04:21)     [facility] - Wang Spence, Vish, Sheldon, Abena, Devan, Herrera; Office Number: [phone]    Initial Consult Note  CHIEF COMPLAINT: Patient is a 74y old  Male who presents with a chief complaint of COVID, AMS (22 Mar 2025 10:24)    HPI: 74M PMH CAD, CABG x 4 vessel, stent x 3, HTN, DM2, HLD, normal pressure hydrocephalus status post ventriculoperitoneal shunt in 2022, 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 Patient is A and O x2 currently, he reports a cough, without any associated SOB or chest pain or palpitations. Denies headaches, nausea, vomiting, chest pain, SOB, abdominal pain, constipation, diarrhea, melena, hematochezia, dysuria. Cardiology called for ? A fib. Follows [facility] for cardiology.     Allergies  codeine (Vomiting; Nausea)  tramadol (Chills; Nausea)  codeine (Other)  tramadol (Rash)    Intolerances      PAST MEDICAL & SURGICAL HISTORY:  Hypertension      Hypertension      CAD (coronary artery disease)      Hyperlipidemia      Type 2 diabetes mellitus  prediabetes      H/O: depression  mild      GERD (gastroesophageal reflux disease)      Hydrocephalus      2019 novel coronavirus disease (COVID-19)      S/P  shunt      Stented coronary artery  3 stents      S/P bilateral inguinal hernia repair      S/P quadruple vessel bypass      S/P cervical spinal fusion  no hardware      H/O melanoma excision        MEDICATIONS  (STANDING):  ascorbic acid 500 milliGRAM(s) Oral daily  clopidogrel Tablet 75 milliGRAM(s) Oral daily  dextrose 5%. 1000 milliLiter(s) (50 mL/Hr) IV Continuous <Continuous>  dextrose 5%. 1000 milliLiter(s) (100 mL/Hr) IV Continuous <Continuous>  dextrose 50% Injectable 25 Gram(s) IV Push once  dextrose 50% Injectable 12.5 Gram(s) IV Push once  dextrose 50% Injectable 25 Gram(s) IV Push once  glucagon  Injectable 1 milliGRAM(s) IntraMuscular once  heparin   Injectable 5000 Unit(s) SubCutaneous every 8 hours  insulin lispro (ADMELOG) corrective regimen sliding scale   SubCutaneous three times a day before meals  insulin lispro (ADMELOG) corrective regimen sliding scale   SubCutaneous at bedtime  losartan 50 milliGRAM(s) Oral daily  magnesium oxide 400 milliGRAM(s) Oral daily  metoprolol succinate  milliGRAM(s) Oral daily  pantoprazole    Tablet 40 milliGRAM(s) Oral before breakfast  PARoxetine 20 milliGRAM(s) Oral daily  remdesivir  IVPB 100 milliGRAM(s) IV Intermittent every 24 hours  remdesivir  IVPB   IV Intermittent   rosuvastatin 40 milliGRAM(s) Oral at bedtime  tamsulosin 0.4 milliGRAM(s) Oral at bedtime    MEDICATIONS  (PRN):  acetaminophen     Tablet .. 650 milliGRAM(s) Oral every 6 hours PRN Temp greater or equal to 38C (100.4F), Mild Pain (1 - 3)  dextrose Oral Gel 15 Gram(s) Oral once PRN Blood Glucose LESS THAN 70 milliGRAM(s)/deciliter  melatonin 3 milliGRAM(s) Oral at bedtime PRN for sleep    FAMILY HISTORY:  No pertinent family history in first degree relatives       No family history of acute MI or sudden cardiac death.    SOCIAL HISTORY: No active tobacco, ethanol, or drug abuse.    REVIEW OF SYSTEMS   All other review of systems is negative unless indicated above.    VITAL SIGNS:   Vital Signs Last 24 Hrs  T(C): 36.7 (23 Mar 2025 04:30), Max: 37.1 (22 Mar 2025 11:00)  T(F): 98.1 (23 Mar 2025 04:30), Max: 98.8 (22 Mar 2025 11:00)  HR: 70 (23 Mar 2025 04:30) (65 - 72)  BP: 125/54 (23 Mar 2025 04:30) (112/62 - 139/47)  BP(mean): --  RR: 18 (23 Mar 2025 04:30) (17 - 18)  SpO2: 96% (23 Mar 2025 04:30) (96% - 99%)    Parameters below as of 23 Mar 2025 04:30  Patient On (Oxygen Delivery Method): room air        Physical Exam:  Constitutional: NAD, awake and alert  HEENT: Moist Mucous Membranes, Anicteric  Pulmonary: Non-labored, breath sounds are clear bilaterally, Diminished at bases No wheezing, rales or rhonchi  Cardiovascular: Regular, S1 and S2, No murmurs, No rubs, gallops or clicks  Gastrointestinal: Bowel Sounds present, soft, nontender.   Lymph: No peripheral edema. No lymphadenopathy.  Skin: No visible rashes or ulcers.  Psych:  Mood & affect appropriate    I&O's Summary    22 Mar 2025 07:01  -  23 Mar 2025 07:00  --------------------------------------------------------  IN: 0 mL / OUT: 500 mL / NET: -500 mL        LABS: All Labs Reviewed:                        13.7   9.84  )-----------( 173      ( 22 Mar 2025 04:55 )             40.2     22 Mar 2025 04:55    138    |  106    |  20     ----------------------------<  141    4.0     |  26     |  0.79     Ca    8.8        22 Mar 2025 04:55    TPro  6.9    /  Alb  3.5    /  TBili  0.7    /  DBili  x      /  AST  18     /  ALT  21     /  AlkPhos  78     22 Mar 2025 04:55    PT/INR - ( 22 Mar 2025 04:55 )   PT: 12.2 sec;   INR: 1.03 ratio         PTT - ( 22 Mar 2025 04:55 )  PTT:22.2 sec    12 Lead ECG:   Ventricular Rate 74 BPM    Atrial Rate 74 BPM    P-R Interval 166 ms    QRS Duration 94 ms    Q-T Interval 404 ms    QTC Calculation(Bazett) 448 ms    P Axis 107 degrees    R Axis -29 degrees    T Axis 90 degrees    Diagnosis Line Normal sinus rhythm with sinus arrhythmia  Nonspecific ST and T wave abnormality  Abnormal ECG  No previous ECGs available  Confirmed by Ashlee Silverman ([id number]) on 3/22/2025 7:11:35 AM (03-22-25 @ 04:21)

## 2025-03-23 NOTE — GOALS OF CARE CONVERSATION - ADVANCED CARE PLANNING - CONVERSATION DETAILS
I reviewed his code status.  In setting of cardiac arrest or acute respiratory failure patient wishes to have full resuscitation efforts.  Pt. remains full code.

## 2025-03-24 LAB
ALBUMIN SERPL ELPH-MCNC: 3.2 G/DL — LOW (ref 3.3–5)
ALP SERPL-CCNC: 46 U/L — SIGNIFICANT CHANGE UP (ref 40–120)
ALT FLD-CCNC: 16 U/L — SIGNIFICANT CHANGE UP (ref 12–78)
ANION GAP SERPL CALC-SCNC: 6 MMOL/L — SIGNIFICANT CHANGE UP (ref 5–17)
AST SERPL-CCNC: 13 U/L — LOW (ref 15–37)
BASOPHILS # BLD AUTO: 0.02 K/UL — SIGNIFICANT CHANGE UP (ref 0–0.2)
BASOPHILS NFR BLD AUTO: 0.4 % — SIGNIFICANT CHANGE UP (ref 0–2)
BILIRUB SERPL-MCNC: 0.4 MG/DL — SIGNIFICANT CHANGE UP (ref 0.2–1.2)
BUN SERPL-MCNC: 12 MG/DL — SIGNIFICANT CHANGE UP (ref 7–23)
CALCIUM SERPL-MCNC: 8.7 MG/DL — SIGNIFICANT CHANGE UP (ref 8.5–10.1)
CHLORIDE SERPL-SCNC: 107 MMOL/L — SIGNIFICANT CHANGE UP (ref 96–108)
CO2 SERPL-SCNC: 24 MMOL/L — SIGNIFICANT CHANGE UP (ref 22–31)
CREAT SERPL-MCNC: 0.59 MG/DL — SIGNIFICANT CHANGE UP (ref 0.5–1.3)
EGFR: 101 ML/MIN/1.73M2 — SIGNIFICANT CHANGE UP
EGFR: 101 ML/MIN/1.73M2 — SIGNIFICANT CHANGE UP
EOSINOPHIL # BLD AUTO: 0.11 K/UL — SIGNIFICANT CHANGE UP (ref 0–0.5)
EOSINOPHIL NFR BLD AUTO: 2.3 % — SIGNIFICANT CHANGE UP (ref 0–6)
GLUCOSE SERPL-MCNC: 124 MG/DL — HIGH (ref 70–99)
HCT VFR BLD CALC: 37.1 % — LOW (ref 39–50)
HGB BLD-MCNC: 12.6 G/DL — LOW (ref 13–17)
IMM GRANULOCYTES NFR BLD AUTO: 0.2 % — SIGNIFICANT CHANGE UP (ref 0–0.9)
INR BLD: 1.05 RATIO — SIGNIFICANT CHANGE UP (ref 0.85–1.16)
LYMPHOCYTES # BLD AUTO: 1.25 K/UL — SIGNIFICANT CHANGE UP (ref 1–3.3)
LYMPHOCYTES # BLD AUTO: 26.6 % — SIGNIFICANT CHANGE UP (ref 13–44)
MAGNESIUM SERPL-MCNC: 2.1 MG/DL — SIGNIFICANT CHANGE UP (ref 1.6–2.6)
MCHC RBC-ENTMCNC: 30.1 PG — SIGNIFICANT CHANGE UP (ref 27–34)
MCHC RBC-ENTMCNC: 34 G/DL — SIGNIFICANT CHANGE UP (ref 32–36)
MCV RBC AUTO: 88.5 FL — SIGNIFICANT CHANGE UP (ref 80–100)
MONOCYTES # BLD AUTO: 0.75 K/UL — SIGNIFICANT CHANGE UP (ref 0–0.9)
MONOCYTES NFR BLD AUTO: 16 % — HIGH (ref 2–14)
NEUTROPHILS # BLD AUTO: 2.56 K/UL — SIGNIFICANT CHANGE UP (ref 1.8–7.4)
NEUTROPHILS NFR BLD AUTO: 54.5 % — SIGNIFICANT CHANGE UP (ref 43–77)
NRBC BLD AUTO-RTO: 0 /100 WBCS — SIGNIFICANT CHANGE UP (ref 0–0)
PLATELET # BLD AUTO: 163 K/UL — SIGNIFICANT CHANGE UP (ref 150–400)
POTASSIUM SERPL-MCNC: 3.8 MMOL/L — SIGNIFICANT CHANGE UP (ref 3.5–5.3)
POTASSIUM SERPL-SCNC: 3.8 MMOL/L — SIGNIFICANT CHANGE UP (ref 3.5–5.3)
PROT SERPL-MCNC: 5.9 G/DL — LOW (ref 6–8.3)
PROTHROM AB SERPL-ACNC: 12.4 SEC — SIGNIFICANT CHANGE UP (ref 9.9–13.4)
RBC # BLD: 4.19 M/UL — LOW (ref 4.2–5.8)
RBC # FLD: 14.9 % — HIGH (ref 10.3–14.5)
SODIUM SERPL-SCNC: 137 MMOL/L — SIGNIFICANT CHANGE UP (ref 135–145)
WBC # BLD: 4.7 K/UL — SIGNIFICANT CHANGE UP (ref 3.8–10.5)
WBC # FLD AUTO: 4.7 K/UL — SIGNIFICANT CHANGE UP (ref 3.8–10.5)

## 2025-03-24 PROCEDURE — 99232 SBSQ HOSP IP/OBS MODERATE 35: CPT

## 2025-03-24 PROCEDURE — 93291 INTERROG DEV EVAL SCRMS IP: CPT | Mod: 26

## 2025-03-24 RX ORDER — HYPROMELLOSE 0.4 %
1 DROPS OPHTHALMIC (EYE)
Qty: 0 | Refills: 0 | DISCHARGE

## 2025-03-24 RX ADMIN — HEPARIN SODIUM 5000 UNIT(S): 1000 INJECTION INTRAVENOUS; SUBCUTANEOUS at 14:57

## 2025-03-24 RX ADMIN — TAMSULOSIN HYDROCHLORIDE 0.4 MILLIGRAM(S): 0.4 CAPSULE ORAL at 22:00

## 2025-03-24 RX ADMIN — REMDESIVIR 200 MILLIGRAM(S): 5 INJECTION INTRAVENOUS at 09:41

## 2025-03-24 RX ADMIN — Medication 650 MILLIGRAM(S): at 17:16

## 2025-03-24 RX ADMIN — Medication 40 MILLIGRAM(S): at 05:47

## 2025-03-24 RX ADMIN — HEPARIN SODIUM 5000 UNIT(S): 1000 INJECTION INTRAVENOUS; SUBCUTANEOUS at 22:01

## 2025-03-24 RX ADMIN — LOSARTAN POTASSIUM 50 MILLIGRAM(S): 100 TABLET, FILM COATED ORAL at 05:46

## 2025-03-24 RX ADMIN — PAROXETINE HYDROCHLORIDE 20 MILLIGRAM(S): 20 TABLET, FILM COATED ORAL at 12:22

## 2025-03-24 RX ADMIN — Medication 650 MILLIGRAM(S): at 17:46

## 2025-03-24 RX ADMIN — Medication 400 MILLIGRAM(S): at 12:22

## 2025-03-24 RX ADMIN — CLOPIDOGREL BISULFATE 75 MILLIGRAM(S): 75 TABLET, FILM COATED ORAL at 12:22

## 2025-03-24 RX ADMIN — HEPARIN SODIUM 5000 UNIT(S): 1000 INJECTION INTRAVENOUS; SUBCUTANEOUS at 05:44

## 2025-03-24 RX ADMIN — ROSUVASTATIN CALCIUM 40 MILLIGRAM(S): 5 TABLET, FILM COATED ORAL at 22:00

## 2025-03-24 RX ADMIN — Medication 500 MILLIGRAM(S): at 12:22

## 2025-03-24 RX ADMIN — METOPROLOL SUCCINATE 100 MILLIGRAM(S): 50 TABLET, EXTENDED RELEASE ORAL at 05:46

## 2025-03-24 RX ADMIN — Medication 650 MILLIGRAM(S): at 05:55

## 2025-03-24 NOTE — PROCEDURE NOTE - ATTENDING PROVIDER
Hand Surgery Clinic Follow Up Note    Chief Complaint  Chief Complaint   Patient presents with    Right Hand - Swelling, Pain     Right index finger       History of Present Illness  52-year-old right-hand dominant female presents for follow up.  I previously saw her 2 months ago.  She has a history of intermittent right hand and wrist swelling.  This is now resolved.  That being said she does have some residual stiffness at the index finger PIPJ joint.  Additionally she has a soft tissue mass which is palpable at the volar radial wrist.  This is not tender.  She has no fevers or chills.  She recently completed therapy which did help.  She has not had swelling and several months.  She continues to have pain at the level of the index finger PIPJ joint.  Pain level is a 2/10 today.  She is unable to make a fist.  She is not having any issues with the ring finger at this time. Patient does not fish or interact with exotic animals.    Review of Systems  Review of systems negative for chest pain, shortness of breath, fevers, chills, nausea/vomiting.    Vital Signs  There were no vitals filed for this visit.    Physical Exam  Constitutional: Appears well-developed and well-nourished. No distress.   HENT:   Head: Normocephalic.   Eyes: EOM are normal.   Pulmonary/Chest: Effort normal.   Neurological: Oriented to person, place, and time.   Psychiatric: Normal mood and affect.     Right Upper Extremity:  No abrasions, lacerations, wounds.  No swelling.  Patient has tenderness at the index finger PIPJ joint.  There is a associated ulnar deviation at the index finger PIPJ joint.  Active and passive index finger MCP motion is 0-90 degrees, PIP motion is 0 to 30°, DIP motion is 0-40 degrees.  Active wrist flexion to 60° and extension to 20°.  Full pronation and supination. The fingers are warm with brisk capillary refill. Sensation is intact in the median, radial, ulnar nerve distributions. Palpable radial pulse.     Imaging  No  new imaging obtained today.    Assessment and Plan  52-year-old female presents for follow up.  She has a history of intermittent right hand and wrist swelling over the past year.  She states the swelling has resolved.  She does have residual stiffness at the index finger PIPJ joint with severe arthritic changes noted.  I discussed potential treatment options with the patient.  I also discussed the patient with Dr. Valera.  We had discussed that it may be beneficial to do a biopsy of the synovium (index PIP and volar wrist) and possible synovectomy and PIP capsulectomy.  The idea would be to evaluate for a possible mycobacterial infection.  In order to do this, I would like to obtain a stat MRI with and without contrast of the right hand to further evaluate.  Patient has not had an MRI in about a year so it is currently out dated.  The purpose of the MRI would be for surgical planning.  MRI was ordered as well as a CMP.  Follow up in clinic after MRI has been obtained to review the results and likely book surgery.    Genevieve Garcia MD  Orthopaedic Hand Surgery   Dr. wisdom

## 2025-03-24 NOTE — PHYSICAL THERAPY INITIAL EVALUATION ADULT - RANGE OF MOTION EXAMINATION, REHAB EVAL
RUE/RLE grossly decreased 25-50%/Left UE ROM was WFL (within functional limits)/Left LE ROM was WFL (within functional limits)

## 2025-03-24 NOTE — PROGRESS NOTE ADULT - NS ATTEND AMEND GEN_ALL_CORE FT
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 Cardiology called for ? A fib. Follows [facility] for cardiology.     Cardiac Arrhythmia (Afib?), CAD, HTN  - EKG showed NSR  - Maintaining NSR on tele.  Will continue for now  - Patient with ILR.  EP to interrogate  - No clear indication for AC at thi point, pending ILR interrogations     - Known CAD s/p CABG  - EKG showed SR PAC @ 66  - No evidence of any active ischemia.  No anginal complaints  - Continue Plavix and statin   - Continue Toprol    - BP stable at systolic 120's  - Continue Losartan to 50mg daily    - No evidence of any meaningful volume overload  - No baseline TTE on record.  Follows with Dr. Carrera  - Can do routinely    - Pt p/w confusion, found to have COVID.  - ID following   - Management per primary team     - BP stable and controlled

## 2025-03-24 NOTE — PATIENT CHOICE NOTE. - NSPTCHOICENOTES_GEN_ALL_CORE
D/C resource folder provided at bedside which includes lists for both rehab facilities and home care agencies and transportation letter advising on ambulance and ambulette transportation. CM reviewed folder during assessment. Pt chose [facility] [phone]

## 2025-03-24 NOTE — PHYSICAL THERAPY INITIAL EVALUATION ADULT - PERTINENT HX OF CURRENT PROBLEM, REHAB EVAL
73yo M presenting with AMS found to have COVID. 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 COVID-19: likely contributing to his AMS. CT head:   No acute cerebral cortical infarct is seen.  No intracranial hemorrhage is found.  No mass effect is found in the brain. s/p Remdesivir x 3 doses

## 2025-03-24 NOTE — CARE COORDINATION ASSESSMENT. - NSPASTMEDSURGHISTORY_GEN_ALL_CORE_FT
PAST MEDICAL & SURGICAL HISTORY:  2019 novel coronavirus disease (COVID-19)      Hydrocephalus      GERD (gastroesophageal reflux disease)      H/O: depression  mild      Type 2 diabetes mellitus  prediabetes      Hyperlipidemia      CAD (coronary artery disease)      Hypertension      S/P cervical spinal fusion  no hardware      S/P quadruple vessel bypass      S/P bilateral inguinal hernia repair      Stented coronary artery  3 stents      S/P  shunt      H/O melanoma excision

## 2025-03-24 NOTE — CARE COORDINATION ASSESSMENT. - NSCAREPROVIDERS_GEN_ALL_CORE_FT
CARE PROVIDERS:  Accepting Physician: Kyree Ovalle  Administration: Thea Robbins  Administration: Archie Lacy  Admitting: Vu Richardson  Attending: Vu Richardson  Case Management: Petey Bradley  Consultant: Vincent Donahue  Consultant: Scott Piña  Consultant: Gauri Houston  Consultant: Janee Lockhart  Consultant: Howard Woodard  Consultant: Ashlee Silverman  ED Attending: Guanaco Bettencourt  ED Nurse: Carlie Higginbotham  HIM/Billing & Coding: Briana Gaffney  Infection Control: Luis La  Nurse: Jazmin White  Nurse: Mira Dwyer  Ordered: Physician, Ordering  Ordered: Doctor, Unknown  Ordered: ServiceAccount, [facility] CenterMLM  Override: María Hernandez  Override: Ayleen Borja  Override: Clara Beckman  Override: Linda Daigle  PCA/Nursing Assistant: Stephania Love  Physical Therapy: Sherrie Robison  Primary Team: Maxx Gill  Primary Team: Vu Richardson  Primary Team: Fernando Lockhart  Registered Dietitian: Carla Perry  Team: [facility], Team  UR// Supp. Assoc.: Marielena Pathak

## 2025-03-24 NOTE — CARE COORDINATION ASSESSMENT. - PRO ARRIVE FROM
CM met w pt. Pt provided verbal consent to speak with spouse Debora. Per pt lives with spouse in a house with elevator services. Pt was independent w ADL, ambulation, and med management prior to admission. Pt spouse drives to appointments, denies current [facility] services, Pt chose [facility] [phone]  has privately hired aide 12 hrs daily 7 days weekly, has walker, wheelchair, cane. CM verified: PCP: Dr. Temple  Pharmacy: [facility] . : stated no. Pt stated his family or his aide will  when medically safe for discharge./home

## 2025-03-25 ENCOUNTER — TRANSCRIPTION ENCOUNTER (OUTPATIENT)
Age: 75
End: 2025-03-25

## 2025-03-25 VITALS
DIASTOLIC BLOOD PRESSURE: 66 MMHG | OXYGEN SATURATION: 94 % | HEART RATE: 89 BPM | RESPIRATION RATE: 18 BRPM | TEMPERATURE: 98 F | SYSTOLIC BLOOD PRESSURE: 114 MMHG

## 2025-03-25 LAB
ALBUMIN SERPL ELPH-MCNC: 3.1 G/DL — LOW (ref 3.3–5)
ALP SERPL-CCNC: 49 U/L — SIGNIFICANT CHANGE UP (ref 40–120)
ALT FLD-CCNC: 17 U/L — SIGNIFICANT CHANGE UP (ref 12–78)
ANION GAP SERPL CALC-SCNC: 5 MMOL/L — SIGNIFICANT CHANGE UP (ref 5–17)
AST SERPL-CCNC: 12 U/L — LOW (ref 15–37)
BASOPHILS # BLD AUTO: 0 K/UL — SIGNIFICANT CHANGE UP (ref 0–0.2)
BASOPHILS NFR BLD AUTO: 0 % — SIGNIFICANT CHANGE UP (ref 0–2)
BILIRUB DIRECT SERPL-MCNC: 0.1 MG/DL — SIGNIFICANT CHANGE UP (ref 0–0.3)
BILIRUB INDIRECT FLD-MCNC: 0.3 MG/DL — SIGNIFICANT CHANGE UP (ref 0.2–1)
BILIRUB SERPL-MCNC: 0.4 MG/DL — SIGNIFICANT CHANGE UP (ref 0.2–1.2)
BUN SERPL-MCNC: 12 MG/DL — SIGNIFICANT CHANGE UP (ref 7–23)
CALCIUM SERPL-MCNC: 8.7 MG/DL — SIGNIFICANT CHANGE UP (ref 8.5–10.1)
CHLORIDE SERPL-SCNC: 106 MMOL/L — SIGNIFICANT CHANGE UP (ref 96–108)
CO2 SERPL-SCNC: 27 MMOL/L — SIGNIFICANT CHANGE UP (ref 22–31)
CREAT SERPL-MCNC: 0.57 MG/DL — SIGNIFICANT CHANGE UP (ref 0.5–1.3)
EGFR: 102 ML/MIN/1.73M2 — SIGNIFICANT CHANGE UP
EGFR: 102 ML/MIN/1.73M2 — SIGNIFICANT CHANGE UP
EOSINOPHIL # BLD AUTO: 0.11 K/UL — SIGNIFICANT CHANGE UP (ref 0–0.5)
EOSINOPHIL NFR BLD AUTO: 3.2 % — SIGNIFICANT CHANGE UP (ref 0–6)
GLUCOSE SERPL-MCNC: 112 MG/DL — HIGH (ref 70–99)
HCT VFR BLD CALC: 37.6 % — LOW (ref 39–50)
HGB BLD-MCNC: 12.9 G/DL — LOW (ref 13–17)
IMM GRANULOCYTES NFR BLD AUTO: 0 % — SIGNIFICANT CHANGE UP (ref 0–0.9)
INR BLD: 1.03 RATIO — SIGNIFICANT CHANGE UP (ref 0.85–1.16)
LYMPHOCYTES # BLD AUTO: 1.51 K/UL — SIGNIFICANT CHANGE UP (ref 1–3.3)
LYMPHOCYTES # BLD AUTO: 44.5 % — HIGH (ref 13–44)
MCHC RBC-ENTMCNC: 30 PG — SIGNIFICANT CHANGE UP (ref 27–34)
MCHC RBC-ENTMCNC: 34.3 G/DL — SIGNIFICANT CHANGE UP (ref 32–36)
MCV RBC AUTO: 87.4 FL — SIGNIFICANT CHANGE UP (ref 80–100)
MONOCYTES # BLD AUTO: 0.46 K/UL — SIGNIFICANT CHANGE UP (ref 0–0.9)
MONOCYTES NFR BLD AUTO: 13.6 % — SIGNIFICANT CHANGE UP (ref 2–14)
NEUTROPHILS # BLD AUTO: 1.31 K/UL — LOW (ref 1.8–7.4)
NEUTROPHILS NFR BLD AUTO: 38.7 % — LOW (ref 43–77)
NRBC BLD AUTO-RTO: 0 /100 WBCS — SIGNIFICANT CHANGE UP (ref 0–0)
PLATELET # BLD AUTO: 177 K/UL — SIGNIFICANT CHANGE UP (ref 150–400)
POTASSIUM SERPL-MCNC: 3.7 MMOL/L — SIGNIFICANT CHANGE UP (ref 3.5–5.3)
POTASSIUM SERPL-SCNC: 3.7 MMOL/L — SIGNIFICANT CHANGE UP (ref 3.5–5.3)
PROT SERPL-MCNC: 5.9 G/DL — LOW (ref 6–8.3)
PROTHROM AB SERPL-ACNC: 12 SEC — SIGNIFICANT CHANGE UP (ref 9.9–13.4)
RBC # BLD: 4.3 M/UL — SIGNIFICANT CHANGE UP (ref 4.2–5.8)
RBC # FLD: 14.7 % — HIGH (ref 10.3–14.5)
SODIUM SERPL-SCNC: 138 MMOL/L — SIGNIFICANT CHANGE UP (ref 135–145)
WBC # BLD: 3.39 K/UL — LOW (ref 3.8–10.5)
WBC # FLD AUTO: 3.39 K/UL — LOW (ref 3.8–10.5)

## 2025-03-25 PROCEDURE — 80048 BASIC METABOLIC PNL TOTAL CA: CPT

## 2025-03-25 PROCEDURE — 99232 SBSQ HOSP IP/OBS MODERATE 35: CPT

## 2025-03-25 PROCEDURE — 99239 HOSP IP/OBS DSCHRG MGMT >30: CPT

## 2025-03-25 PROCEDURE — 99285 EMERGENCY DEPT VISIT HI MDM: CPT | Mod: 25

## 2025-03-25 PROCEDURE — 36415 COLL VENOUS BLD VENIPUNCTURE: CPT

## 2025-03-25 PROCEDURE — 80053 COMPREHEN METABOLIC PANEL: CPT

## 2025-03-25 PROCEDURE — 83735 ASSAY OF MAGNESIUM: CPT

## 2025-03-25 PROCEDURE — 85610 PROTHROMBIN TIME: CPT

## 2025-03-25 PROCEDURE — 87637 SARSCOV2&INF A&B&RSV AMP PRB: CPT

## 2025-03-25 PROCEDURE — 96374 THER/PROPH/DIAG INJ IV PUSH: CPT

## 2025-03-25 PROCEDURE — 83605 ASSAY OF LACTIC ACID: CPT

## 2025-03-25 PROCEDURE — 87040 BLOOD CULTURE FOR BACTERIA: CPT

## 2025-03-25 PROCEDURE — 82248 BILIRUBIN DIRECT: CPT

## 2025-03-25 PROCEDURE — 71045 X-RAY EXAM CHEST 1 VIEW: CPT

## 2025-03-25 PROCEDURE — 82962 GLUCOSE BLOOD TEST: CPT

## 2025-03-25 PROCEDURE — 85025 COMPLETE CBC W/AUTO DIFF WBC: CPT

## 2025-03-25 PROCEDURE — 80076 HEPATIC FUNCTION PANEL: CPT

## 2025-03-25 PROCEDURE — 81003 URINALYSIS AUTO W/O SCOPE: CPT

## 2025-03-25 PROCEDURE — 93005 ELECTROCARDIOGRAM TRACING: CPT

## 2025-03-25 PROCEDURE — 70450 CT HEAD/BRAIN W/O DYE: CPT | Mod: MC

## 2025-03-25 PROCEDURE — 83036 HEMOGLOBIN GLYCOSYLATED A1C: CPT

## 2025-03-25 PROCEDURE — 96375 TX/PRO/DX INJ NEW DRUG ADDON: CPT

## 2025-03-25 PROCEDURE — 85730 THROMBOPLASTIN TIME PARTIAL: CPT

## 2025-03-25 PROCEDURE — 97162 PT EVAL MOD COMPLEX 30 MIN: CPT

## 2025-03-25 RX ADMIN — Medication 40 MILLIGRAM(S): at 05:15

## 2025-03-25 RX ADMIN — HEPARIN SODIUM 5000 UNIT(S): 1000 INJECTION INTRAVENOUS; SUBCUTANEOUS at 05:15

## 2025-03-25 RX ADMIN — Medication 400 MILLIGRAM(S): at 11:57

## 2025-03-25 RX ADMIN — Medication 650 MILLIGRAM(S): at 06:54

## 2025-03-25 RX ADMIN — PAROXETINE HYDROCHLORIDE 20 MILLIGRAM(S): 20 TABLET, FILM COATED ORAL at 11:56

## 2025-03-25 RX ADMIN — LOSARTAN POTASSIUM 50 MILLIGRAM(S): 100 TABLET, FILM COATED ORAL at 05:15

## 2025-03-25 RX ADMIN — Medication 500 MILLIGRAM(S): at 11:56

## 2025-03-25 RX ADMIN — CLOPIDOGREL BISULFATE 75 MILLIGRAM(S): 75 TABLET, FILM COATED ORAL at 11:57

## 2025-03-25 RX ADMIN — HEPARIN SODIUM 5000 UNIT(S): 1000 INJECTION INTRAVENOUS; SUBCUTANEOUS at 14:46

## 2025-03-25 RX ADMIN — METOPROLOL SUCCINATE 100 MILLIGRAM(S): 50 TABLET, EXTENDED RELEASE ORAL at 05:19

## 2025-03-25 NOTE — DISCHARGE NOTE NURSING/CASE MANAGEMENT/SOCIAL WORK - PATIENT PORTAL LINK FT
You can access the FollowMyHealth Patient Portal offered by [facility] by registering at the following website: [email or web address]. By joining [facility]’s FollowMyHealth portal, you will also be able to view your health information using other applications (apps) compatible with our system.

## 2025-03-25 NOTE — PROGRESS NOTE ADULT - NS ATTEND AMEND GEN_ALL_CORE FT
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 Cardiology called for ? A fib. Follows [facility] for cardiology.     - No A fib noted on tele  - Maintaining NSR on tele, now off tele   - EP interrogated device No episodes of tachycardia, Bradycardia, Pause,  High grade block or symptoms per patient.   - No clear indication for AC at this point  - Continue Plavix and statin   - Continue Toprol  - Continue Losartan to 50mg daily  - Check routine echo

## 2025-03-25 NOTE — PROGRESS NOTE ADULT - SUBJECTIVE AND OBJECTIVE BOX
CHIEF COMPLAINT/INTERVAL HISTORY:  Pt. seen and evaluated for COVID 19 infection.  Pt. is in no distress.  Tolerating Remdesivir.  Denies having SOB.  Currently on room air with SpO2 in 90th%.       REVIEW OF SYSTEMS:  No fever, CP, SOB, or abdominal pain      Vital Signs Last 24 Hrs  T(C): 36.7 (23 Mar 2025 04:30), Max: 37.1 (22 Mar 2025 11:00)  T(F): 98.1 (23 Mar 2025 04:30), Max: 98.8 (22 Mar 2025 11:00)  HR: 70 (23 Mar 2025 04:30) (65 - 72)  BP: 125/54 (23 Mar 2025 04:30) (112/62 - 139/47)  BP(mean): --  RR: 18 (23 Mar 2025 04:30) (17 - 18)  SpO2: 96% (23 Mar 2025 04:30) (96% - 99%)    Parameters below as of 23 Mar 2025 04:30  Patient On (Oxygen Delivery Method): room air        PHYSICAL EXAM:  GENERAL: NAD  HEENT: EOMI, hearing normal, conjunctiva and sclera clear  Chest: CTA bilaterally, no wheezing  CV: S1S2, RRR,   GI: soft, +BS, NT/ND  Musculoskeletal: no LE edema  Neuro:  CN2-12 intact, RUE in flexion contracture. muscle strength 3/5 RUE/RLE, 4/5 in LUE/LLE  Psychiatric: affect nL, mood nL  Skin: warm and dry    LABS:                        13.7   9.84  )-----------( 173      ( 22 Mar 2025 04:55 )             40.2     03-22    138  |  106  |  20  ----------------------------<  141[H]  4.0   |  26  |  0.79    Ca    8.8      22 Mar 2025 04:55    TPro  6.9  /  Alb  3.5  /  TBili  0.7  /  DBili  x   /  AST  18  /  ALT  21  /  AlkPhos  78  03-22    PT/INR - ( 22 Mar 2025 04:55 )   PT: 12.2 sec;   INR: 1.03 ratio         PTT - ( 22 Mar 2025 04:55 )  PTT:22.2 sec  Urinalysis Basic - ( 22 Mar 2025 09:00 )    Color: Yellow / Appearance: Clear / S.038 / pH: x  Gluc: x / Ketone: Trace mg/dL  / Bili: Negative / Urobili: 0.2 mg/dL   Blood: x / Protein: Negative mg/dL / Nitrite: Negative   Leuk Esterase: Negative / RBC: x / WBC x   Sq Epi: x / Non Sq Epi: x / Bacteria: x        
Date/Time Patient Seen:  		  Referring MD:   Data Reviewed	       Patient is a 74y old  Male who presents with a chief complaint of COVID, AMS (23 Mar 2025 12:20)      Subjective/HPI     PAST MEDICAL & SURGICAL HISTORY:  Hypertension    CAD (coronary artery disease)    Hyperlipidemia    Type 2 diabetes mellitus  prediabetes    H/O: depression  mild    GERD (gastroesophageal reflux disease)    Hydrocephalus    2019 novel coronavirus disease (COVID-19)    S/P  shunt    Stented coronary artery  3 stents    S/P bilateral inguinal hernia repair    S/P quadruple vessel bypass    S/P cervical spinal fusion  no hardware    H/O melanoma excision          Medication list         MEDICATIONS  (STANDING):  ascorbic acid 500 milliGRAM(s) Oral daily  clopidogrel Tablet 75 milliGRAM(s) Oral daily  dextrose 5%. 1000 milliLiter(s) (50 mL/Hr) IV Continuous <Continuous>  dextrose 5%. 1000 milliLiter(s) (100 mL/Hr) IV Continuous <Continuous>  dextrose 50% Injectable 25 Gram(s) IV Push once  dextrose 50% Injectable 12.5 Gram(s) IV Push once  dextrose 50% Injectable 25 Gram(s) IV Push once  glucagon  Injectable 1 milliGRAM(s) IntraMuscular once  heparin   Injectable 5000 Unit(s) SubCutaneous every 8 hours  insulin lispro (ADMELOG) corrective regimen sliding scale   SubCutaneous three times a day before meals  insulin lispro (ADMELOG) corrective regimen sliding scale   SubCutaneous at bedtime  losartan 50 milliGRAM(s) Oral daily  magnesium oxide 400 milliGRAM(s) Oral daily  metoprolol succinate  milliGRAM(s) Oral daily  pantoprazole    Tablet 40 milliGRAM(s) Oral before breakfast  PARoxetine 20 milliGRAM(s) Oral daily  remdesivir  IVPB 100 milliGRAM(s) IV Intermittent every 24 hours  remdesivir  IVPB   IV Intermittent   rosuvastatin 40 milliGRAM(s) Oral at bedtime  tamsulosin 0.4 milliGRAM(s) Oral at bedtime    MEDICATIONS  (PRN):  acetaminophen     Tablet .. 650 milliGRAM(s) Oral every 6 hours PRN Temp greater or equal to 38C (100.4F), Mild Pain (1 - 3)  artificial tears (preservative free) Ophthalmic Solution 1 Drop(s) Both EYES every 4 hours PRN Dry Eyes  clonazePAM  Tablet 0.25 milliGRAM(s) Oral daily PRN agitation  dextrose Oral Gel 15 Gram(s) Oral once PRN Blood Glucose LESS THAN 70 milliGRAM(s)/deciliter         Vitals log        ICU Vital Signs Last 24 Hrs  T(C): 36.6 (24 Mar 2025 04:19), Max: 36.7 (23 Mar 2025 11:22)  T(F): 97.9 (24 Mar 2025 04:19), Max: 98.1 (23 Mar 2025 11:22)  HR: 77 (24 Mar 2025 04:19) (57 - 87)  BP: 127/77 (24 Mar 2025 04:19) (121/66 - 134/82)  BP(mean): --  ABP: --  ABP(mean): --  RR: 18 (24 Mar 2025 04:19) (17 - 18)  SpO2: 96% (24 Mar 2025 04:19) (93% - 97%)    O2 Parameters below as of 24 Mar 2025 04:19  Patient On (Oxygen Delivery Method): room air                 Input and Output:  I&O's Detail    22 Mar 2025 07:01  -  23 Mar 2025 07:00  --------------------------------------------------------  IN:  Total IN: 0 mL    OUT:    Voided (mL): 500 mL  Total OUT: 500 mL    Total NET: -500 mL      23 Mar 2025 07:01  -  24 Mar 2025 05:08  --------------------------------------------------------  IN:  Total IN: 0 mL    OUT:    Voided (mL): 400 mL  Total OUT: 400 mL    Total NET: -400 mL          Lab Data                        12.3   5.26  )-----------( 146      ( 23 Mar 2025 07:55 )             36.1     03-23    138  |  106  |  15  ----------------------------<  112[H]  3.2[L]   |  25  |  0.56    Ca    8.5      23 Mar 2025 07:55    TPro  6.2  /  Alb  3.1[L]  /  TBili  0.5  /  DBili  0.1  /  AST  9[L]  /  ALT  19  /  AlkPhos  46  03-23            Review of Systems	      Objective     Physical Examination    heart s1s2  lung dc BS  head nc  head at      Pertinent Lab findings & Imaging      Alka:  NO   Adequate UO     I&O's Detail    22 Mar 2025 07:01  -  23 Mar 2025 07:00  --------------------------------------------------------  IN:  Total IN: 0 mL    OUT:    Voided (mL): 500 mL  Total OUT: 500 mL    Total NET: -500 mL      23 Mar 2025 07:01  -  24 Mar 2025 05:08  --------------------------------------------------------  IN:  Total IN: 0 mL    OUT:    Voided (mL): 400 mL  Total OUT: 400 mL    Total NET: -400 mL               Discussed with:     Cultures:	        Radiology                            
CHIEF COMPLAINT/INTERVAL HISTORY:  Pt. seen and evaluated for COVI 19 infection.  Pt. is in no distress.  Denies having SOB.  Tolerating Remdesivir.      REVIEW OF SYSTEMS:  No fever, CP, SOB, or abdominal pain    Vital Signs Last 24 Hrs  T(C): 36.6 (24 Mar 2025 04:19), Max: 36.6 (23 Mar 2025 21:40)  T(F): 97.9 (24 Mar 2025 04:19), Max: 97.9 (23 Mar 2025 21:40)  HR: 77 (24 Mar 2025 04:19) (63 - 87)  BP: 127/77 (24 Mar 2025 04:19) (127/77 - 134/82)  BP(mean): --  RR: 18 (24 Mar 2025 04:19) (18 - 18)  SpO2: 96% (24 Mar 2025 04:19) (96% - 97%)    Parameters below as of 24 Mar 2025 04:19  Patient On (Oxygen Delivery Method): room air        PHYSICAL EXAM:  GENERAL: NAD  HEENT: EOMI, hearing normal, conjunctiva and sclera clear  Chest: CTA bilaterally, no wheezing  CV: S1S2, RRR,   GI: soft, +BS, NT/ND  Musculoskeletal: no LE edema  Psychiatric: affect nL, mood nL  Skin: warm and dry    LABS:                        12.6   4.70  )-----------( 163      ( 24 Mar 2025 06:10 )             37.1     03-24    137  |  107  |  12  ----------------------------<  124[H]  3.8   |  24  |  0.59    Ca    8.7      24 Mar 2025 06:10  Mg     2.1     03-24    TPro  5.9[L]  /  Alb  3.2[L]  /  TBili  0.4  /  DBili  0.1  /  AST  13[L]  /  ALT  16  /  AlkPhos  46  03-24    PT/INR - ( 24 Mar 2025 06:10 )   PT: 12.4 sec;   INR: 1.05 ratio           Urinalysis Basic - ( 24 Mar 2025 06:10 )    Color: x / Appearance: x / SG: x / pH: x  Gluc: 124 mg/dL / Ketone: x  / Bili: x / Urobili: x   Blood: x / Protein: x / Nitrite: x   Leuk Esterase: x / RBC: x / WBC x   Sq Epi: x / Non Sq Epi: x / Bacteria: x        
[facility]  IAN Arellano Y. Patel, S. Shah, G. [facility]  [phone]    Name: GINA BOWDEN  Age: 74y  Gender: Male  MRN: [id number]    Interval History:  No acute overnight events.   Notes reviewed    Antibiotics:      Medications:  acetaminophen     Tablet .. 650 milliGRAM(s) Oral every 6 hours PRN  artificial tears (preservative free) Ophthalmic Solution 1 Drop(s) Both EYES every 4 hours PRN  ascorbic acid 500 milliGRAM(s) Oral daily  clonazePAM  Tablet 0.25 milliGRAM(s) Oral daily PRN  clopidogrel Tablet 75 milliGRAM(s) Oral daily  dextrose 5%. 1000 milliLiter(s) IV Continuous <Continuous>  dextrose 5%. 1000 milliLiter(s) IV Continuous <Continuous>  dextrose 50% Injectable 25 Gram(s) IV Push once  dextrose 50% Injectable 12.5 Gram(s) IV Push once  dextrose 50% Injectable 25 Gram(s) IV Push once  dextrose Oral Gel 15 Gram(s) Oral once PRN  glucagon  Injectable 1 milliGRAM(s) IntraMuscular once  heparin   Injectable 5000 Unit(s) SubCutaneous every 8 hours  insulin lispro (ADMELOG) corrective regimen sliding scale   SubCutaneous three times a day before meals  insulin lispro (ADMELOG) corrective regimen sliding scale   SubCutaneous at bedtime  losartan 50 milliGRAM(s) Oral daily  magnesium oxide 400 milliGRAM(s) Oral daily  metoprolol succinate  milliGRAM(s) Oral daily  pantoprazole    Tablet 40 milliGRAM(s) Oral before breakfast  PARoxetine 20 milliGRAM(s) Oral daily  rosuvastatin 40 milliGRAM(s) Oral at bedtime  tamsulosin 0.4 milliGRAM(s) Oral at bedtime      Review of Systems:  A 10-point review of systems was obtained.   Review of systems otherwise negative except as previously noted.    Allergies: codeine (Vomiting; Nausea)  tramadol (Chills; Nausea)  codeine (Other)  tramadol (Rash)    For details regarding the patient's past medical history, social history, family history, and other miscellaneous elements, please refer the initial infectious diseases consultation and/or the admitting history and physical examination for this admission.    Objective:  Vitals:   T(C): 36.4 (03-25-25 @ 11:12), Max: 36.7 (03-24-25 @ 12:02)  HR: 59 (03-25-25 @ 11:12) (59 - 83)  BP: 120/68 (03-25-25 @ 11:12) (119/74 - 146/70)  RR: 18 (03-25-25 @ 11:12) (18 - 18)  SpO2: 94% (03-25-25 @ 11:12) (94% - 97%)    Physical Examination:  General: no acute distress  HEENT: NC/AT, EOMI  Cardio: RRR  Resp: decreased breath sounds  Abd: soft, NT, ND  Ext: no edema or cyanosis  Skin: warm, dry, no visible rash      Laboratory Studies:  CBC:                       12.9   3.39  )-----------( 177      ( 25 Mar 2025 05:55 )             37.6     CMP: 03-25    138  |  106  |  12  ----------------------------<  112[H]  3.7   |  27  |  0.57    Ca    8.7      25 Mar 2025 05:55  Mg     2.1     03-24    TPro  5.9[L]  /  Alb  3.1[L]  /  TBili  0.4  /  DBili  0.1  /  AST  12[L]  /  ALT  17  /  AlkPhos  49  03-25    LIVER FUNCTIONS - ( 25 Mar 2025 05:55 )  Alb: 3.1 g/dL / Pro: 5.9 g/dL / ALK PHOS: 49 U/L / ALT: 17 U/L / AST: 12 U/L / GGT: x           Urinalysis Basic - ( 25 Mar 2025 05:55 )    Color: x / Appearance: x / SG: x / pH: x  Gluc: 112 mg/dL / Ketone: x  / Bili: x / Urobili: x   Blood: x / Protein: x / Nitrite: x   Leuk Esterase: x / RBC: x / WBC x   Sq Epi: x / Non Sq Epi: x / Bacteria: x        Microbiology: reviewed    Urinalysis with Rflx Culture (collected 03-22-25 @ 09:00)    Culture - Blood (collected 03-22-25 @ 04:55)  Source: Blood Blood  Preliminary Report (03-24-25 @ 15:01):    No growth at 48 Hours    Culture - Blood (collected 03-22-25 @ 04:50)  Source: Blood Blood  Preliminary Report (03-24-25 @ 15:01):    No growth at 48 Hours          Radiology: reviewed      
[place] [facility]  IAN Arellano Y. Patel, S. Shah, G. [facility]  [phone]    Name: GINA BOWDEN  Age: 74y  Gender: Male  MRN: [id number]    Interval History:  No acute overnight events.   Notes reviewed    Antibiotics:      Medications:  acetaminophen     Tablet .. 650 milliGRAM(s) Oral every 6 hours PRN  artificial tears (preservative free) Ophthalmic Solution 1 Drop(s) Both EYES every 4 hours PRN  ascorbic acid 500 milliGRAM(s) Oral daily  clonazePAM  Tablet 0.25 milliGRAM(s) Oral daily PRN  clopidogrel Tablet 75 milliGRAM(s) Oral daily  dextrose 5%. 1000 milliLiter(s) IV Continuous <Continuous>  dextrose 5%. 1000 milliLiter(s) IV Continuous <Continuous>  dextrose 50% Injectable 25 Gram(s) IV Push once  dextrose 50% Injectable 12.5 Gram(s) IV Push once  dextrose 50% Injectable 25 Gram(s) IV Push once  dextrose Oral Gel 15 Gram(s) Oral once PRN  glucagon  Injectable 1 milliGRAM(s) IntraMuscular once  heparin   Injectable 5000 Unit(s) SubCutaneous every 8 hours  insulin lispro (ADMELOG) corrective regimen sliding scale   SubCutaneous three times a day before meals  insulin lispro (ADMELOG) corrective regimen sliding scale   SubCutaneous at bedtime  losartan 50 milliGRAM(s) Oral daily  magnesium oxide 400 milliGRAM(s) Oral daily  metoprolol succinate  milliGRAM(s) Oral daily  pantoprazole    Tablet 40 milliGRAM(s) Oral before breakfast  PARoxetine 20 milliGRAM(s) Oral daily  rosuvastatin 40 milliGRAM(s) Oral at bedtime  tamsulosin 0.4 milliGRAM(s) Oral at bedtime      Review of Systems:  A 10-point review of systems was obtained.   Review of systems otherwise negative except as previously noted.    Allergies: codeine (Vomiting; Nausea)  tramadol (Chills; Nausea)  codeine (Other)  tramadol (Rash)    For details regarding the patient's past medical history, social history, family history, and other miscellaneous elements, please refer the initial infectious diseases consultation and/or the admitting history and physical examination for this admission.    Objective:  Vitals:   T(C): 36.7 (03-24-25 @ 12:02), Max: 36.7 (03-24-25 @ 12:02)  HR: 80 (03-24-25 @ 12:02) (63 - 87)  BP: 146/70 (03-24-25 @ 12:02) (127/77 - 146/70)  RR: 18 (03-24-25 @ 12:02) (18 - 18)  SpO2: 96% (03-24-25 @ 12:02) (96% - 97%)    Physical Examination:  General: no acute distress  HEENT: NC/AT, EOMI  Cardio: RRR  Resp: CTA  Abd: soft, NT, ND  Ext: no edema or cyanosis  Skin: warm, dry, no visible rash      Laboratory Studies:  CBC:                       12.6   4.70  )-----------( 163      ( 24 Mar 2025 06:10 )             37.1     CMP: 03-24    137  |  107  |  12  ----------------------------<  124[H]  3.8   |  24  |  0.59    Ca    8.7      24 Mar 2025 06:10  Mg     2.1     03-24    TPro  5.9[L]  /  Alb  3.2[L]  /  TBili  0.4  /  DBili  0.1  /  AST  13[L]  /  ALT  16  /  AlkPhos  46  03-24    LIVER FUNCTIONS - ( 24 Mar 2025 06:10 )  Alb: 3.2 g/dL / Pro: 5.9 g/dL / ALK PHOS: 46 U/L / ALT: 16 U/L / AST: 13 U/L / GGT: x           Urinalysis Basic - ( 24 Mar 2025 06:10 )    Color: x / Appearance: x / SG: x / pH: x  Gluc: 124 mg/dL / Ketone: x  / Bili: x / Urobili: x   Blood: x / Protein: x / Nitrite: x   Leuk Esterase: x / RBC: x / WBC x   Sq Epi: x / Non Sq Epi: x / Bacteria: x        Microbiology: reviewed    Urinalysis with Rflx Culture (collected 03-22-25 @ 09:00)    Culture - Blood (collected 03-22-25 @ 04:55)  Source: Blood Blood  Preliminary Report (03-23-25 @ 15:01):    No growth at 24 hours    Culture - Blood (collected 03-22-25 @ 04:50)  Source: Blood Blood  Preliminary Report (03-23-25 @ 15:01):    No growth at 24 hours          Radiology: reviewed      
Date/Time Patient Seen:  		  Referring MD:   Data Reviewed	       Patient is a 74y old  Male who presents with a chief complaint of COVID, AMS (24 Mar 2025 12:18)      Subjective/HPI     PAST MEDICAL & SURGICAL HISTORY:  Hypertension    CAD (coronary artery disease)    Hyperlipidemia    Type 2 diabetes mellitus  prediabetes    H/O: depression  mild    GERD (gastroesophageal reflux disease)    Hydrocephalus    2019 novel coronavirus disease (COVID-19)    S/P  shunt    Stented coronary artery  3 stents    S/P bilateral inguinal hernia repair    S/P quadruple vessel bypass    S/P cervical spinal fusion  no hardware    H/O melanoma excision          Medication list         MEDICATIONS  (STANDING):  ascorbic acid 500 milliGRAM(s) Oral daily  clopidogrel Tablet 75 milliGRAM(s) Oral daily  dextrose 5%. 1000 milliLiter(s) (50 mL/Hr) IV Continuous <Continuous>  dextrose 5%. 1000 milliLiter(s) (100 mL/Hr) IV Continuous <Continuous>  dextrose 50% Injectable 25 Gram(s) IV Push once  dextrose 50% Injectable 12.5 Gram(s) IV Push once  dextrose 50% Injectable 25 Gram(s) IV Push once  glucagon  Injectable 1 milliGRAM(s) IntraMuscular once  heparin   Injectable 5000 Unit(s) SubCutaneous every 8 hours  insulin lispro (ADMELOG) corrective regimen sliding scale   SubCutaneous three times a day before meals  insulin lispro (ADMELOG) corrective regimen sliding scale   SubCutaneous at bedtime  losartan 50 milliGRAM(s) Oral daily  magnesium oxide 400 milliGRAM(s) Oral daily  metoprolol succinate  milliGRAM(s) Oral daily  pantoprazole    Tablet 40 milliGRAM(s) Oral before breakfast  PARoxetine 20 milliGRAM(s) Oral daily  rosuvastatin 40 milliGRAM(s) Oral at bedtime  tamsulosin 0.4 milliGRAM(s) Oral at bedtime    MEDICATIONS  (PRN):  acetaminophen     Tablet .. 650 milliGRAM(s) Oral every 6 hours PRN Temp greater or equal to 38C (100.4F), Mild Pain (1 - 3)  artificial tears (preservative free) Ophthalmic Solution 1 Drop(s) Both EYES every 4 hours PRN Dry Eyes  clonazePAM  Tablet 0.25 milliGRAM(s) Oral daily PRN agitation  dextrose Oral Gel 15 Gram(s) Oral once PRN Blood Glucose LESS THAN 70 milliGRAM(s)/deciliter         Vitals log        ICU Vital Signs Last 24 Hrs  T(C): 36.6 (25 Mar 2025 04:22), Max: 36.7 (24 Mar 2025 12:02)  T(F): 97.9 (25 Mar 2025 04:22), Max: 98 (24 Mar 2025 12:02)  HR: 83 (25 Mar 2025 04:22) (59 - 83)  BP: 119/74 (25 Mar 2025 04:22) (119/74 - 146/70)  BP(mean): --  ABP: --  ABP(mean): --  RR: 18 (25 Mar 2025 04:22) (18 - 18)  SpO2: 97% (25 Mar 2025 04:22) (96% - 97%)    O2 Parameters below as of 25 Mar 2025 04:22  Patient On (Oxygen Delivery Method): room air                 Input and Output:  I&O's Detail    23 Mar 2025 07:01  -  24 Mar 2025 07:00  --------------------------------------------------------  IN:  Total IN: 0 mL    OUT:    Voided (mL): 400 mL  Total OUT: 400 mL    Total NET: -400 mL      24 Mar 2025 07:01  -  25 Mar 2025 05:25  --------------------------------------------------------  IN:  Total IN: 0 mL    OUT:    Stool (mL): 1 mL    Voided (mL): 1200 mL  Total OUT: 1201 mL    Total NET: -1201 mL          Lab Data                        12.6   4.70  )-----------( 163      ( 24 Mar 2025 06:10 )             37.1     03-24    137  |  107  |  12  ----------------------------<  124[H]  3.8   |  24  |  0.59    Ca    8.7      24 Mar 2025 06:10  Mg     2.1     03-24    TPro  5.9[L]  /  Alb  3.2[L]  /  TBili  0.4  /  DBili  0.1  /  AST  13[L]  /  ALT  16  /  AlkPhos  46  03-24            Review of Systems	      Objective     Physical Examination    heart s1s2  lung dc bS  head nc  head at      Pertinent Lab findings & Imaging      Alka:  NO   Adequate UO     I&O's Detail    23 Mar 2025 07:01  -  24 Mar 2025 07:00  --------------------------------------------------------  IN:  Total IN: 0 mL    OUT:    Voided (mL): 400 mL  Total OUT: 400 mL    Total NET: -400 mL      24 Mar 2025 07:01  -  25 Mar 2025 05:25  --------------------------------------------------------  IN:  Total IN: 0 mL    OUT:    Stool (mL): 1 mL    Voided (mL): 1200 mL  Total OUT: 1201 mL    Total NET: -1201 mL               Discussed with:     Cultures:	        Radiology                            
[facility] -- Wang Spence, Sheldon Wahl Savella, , Abena Silverman  Office # [phone]    Follow Up:  Afib?    Subjective/Observations: Seen and evaluated, comfortable on RA.  Denies SOB, Yu or orthopnea.  Admits to mild cough.  Denies CP or palpitations    REVIEW OF SYSTEMS: All other review of systems is negative unless indicated above  PAST MEDICAL & SURGICAL HISTORY:  Hypertension  CAD (coronary artery disease)  Hyperlipidemia  Type 2 diabetes mellitus  prediabetes  H/O: depression  mild  GERD (gastroesophageal reflux disease)  Hydrocephalus  2019 novel coronavirus disease (COVID-19)  S/P  shunt  Stented coronary artery  3 stents  S/P bilateral inguinal hernia repair  S/P quadruple vessel bypass  S/P cervical spinal fusion  no hardware  H/O melanoma excision    MEDICATIONS  (STANDING):  ascorbic acid 500 milliGRAM(s) Oral daily  clopidogrel Tablet 75 milliGRAM(s) Oral daily  dextrose 5%. 1000 milliLiter(s) (50 mL/Hr) IV Continuous <Continuous>  dextrose 5%. 1000 milliLiter(s) (100 mL/Hr) IV Continuous <Continuous>  dextrose 50% Injectable 25 Gram(s) IV Push once  dextrose 50% Injectable 12.5 Gram(s) IV Push once  dextrose 50% Injectable 25 Gram(s) IV Push once  glucagon  Injectable 1 milliGRAM(s) IntraMuscular once  heparin   Injectable 5000 Unit(s) SubCutaneous every 8 hours  insulin lispro (ADMELOG) corrective regimen sliding scale   SubCutaneous three times a day before meals  insulin lispro (ADMELOG) corrective regimen sliding scale   SubCutaneous at bedtime  losartan 50 milliGRAM(s) Oral daily  magnesium oxide 400 milliGRAM(s) Oral daily  metoprolol succinate  milliGRAM(s) Oral daily  pantoprazole    Tablet 40 milliGRAM(s) Oral before breakfast  PARoxetine 20 milliGRAM(s) Oral daily  remdesivir  IVPB 100 milliGRAM(s) IV Intermittent every 24 hours  remdesivir  IVPB   IV Intermittent   rosuvastatin 40 milliGRAM(s) Oral at bedtime  tamsulosin 0.4 milliGRAM(s) Oral at bedtime    MEDICATIONS  (PRN):  acetaminophen     Tablet .. 650 milliGRAM(s) Oral every 6 hours PRN Temp greater or equal to 38C (100.4F), Mild Pain (1 - 3)  artificial tears (preservative free) Ophthalmic Solution 1 Drop(s) Both EYES every 4 hours PRN Dry Eyes  clonazePAM  Tablet 0.25 milliGRAM(s) Oral daily PRN agitation  dextrose Oral Gel 15 Gram(s) Oral once PRN Blood Glucose LESS THAN 70 milliGRAM(s)/deciliter    Allergies    codeine (Vomiting; Nausea)  tramadol (Chills; Nausea)  codeine (Other)  tramadol (Rash)    Intolerances    Vital Signs Last 24 Hrs  T(C): 36.6 (24 Mar 2025 04:19), Max: 36.7 (23 Mar 2025 11:22)  T(F): 97.9 (24 Mar 2025 04:19), Max: 98.1 (23 Mar 2025 11:22)  HR: 77 (24 Mar 2025 04:19) (57 - 87)  BP: 127/77 (24 Mar 2025 04:19) (121/66 - 134/82)  BP(mean): --  RR: 18 (24 Mar 2025 04:19) (17 - 18)  SpO2: 96% (24 Mar 2025 04:19) (93% - 97%)    Parameters below as of 24 Mar 2025 04:19  Patient On (Oxygen Delivery Method): room air    I&O's Summary    23 Mar 2025 07:01  -  24 Mar 2025 07:00  --------------------------------------------------------  IN: 0 mL / OUT: 400 mL / NET: -400 mL    PHYSICAL EXAM:  TELE: NSR  Constitutional: NAD, awake and alert, well-developed  HEENT: Moist Mucous Membranes, Anicteric  Pulmonary: Non-labored, breath sounds are diminished bilaterally, No wheezing, rales or rhonchi  Cardiovascular: Regular, S1 and S2, No murmurs, rubs, gallops or clicks  Gastrointestinal: Bowel Sounds present, soft, nontender.   Lymph: No peripheral edema. No lymphadenopathy.  MSK: Rt ortho boot  Skin: No visible rashes or ulcers.  Psych:  Mood & affect appropriate  LABS: All Labs Reviewed:                        12.6   4.70  )-----------( 163      ( 24 Mar 2025 06:10 )             37.1                         12.3   5.26  )-----------( 146      ( 23 Mar 2025 07:55 )             36.1                         13.7   9.84  )-----------( 173      ( 22 Mar 2025 04:55 )             40.2     24 Mar 2025 06:10    137    |  107    |  12     ----------------------------<  124    3.8     |  24     |  0.59   23 Mar 2025 07:55    138    |  106    |  15     ----------------------------<  112    3.2     |  25     |  0.56   22 Mar 2025 04:55    138    |  106    |  20     ----------------------------<  141    4.0     |  26     |  0.79     Ca    8.7        24 Mar 2025 06:10  Ca    8.5        23 Mar 2025 07:55  Ca    8.8        22 Mar 2025 04:55  Mg     2.1       24 Mar 2025 06:10    TPro  5.9    /  Alb  3.2    /  TBili  0.4    /  DBili  0.1    /  AST  13     /  ALT  16     /  AlkPhos  46     24 Mar 2025 06:10  TPro  6.2    /  Alb  3.1    /  TBili  0.5    /  DBili  0.1    /  AST  9      /  ALT  19     /  AlkPhos  46     23 Mar 2025 07:55  TPro  6.9    /  Alb  3.5    /  TBili  0.7    /  DBili  x      /  AST  18     /  ALT  21     /  AlkPhos  78     22 Mar 2025 04:55    PT/INR - ( 24 Mar 2025 06:10 )   PT: 12.4 sec;   INR: 1.05 ratio      12 Lead ECG:   Ventricular Rate 66 BPM    Atrial Rate 66 BPM    P-R Interval 136 ms    QRS Duration 100 ms    Q-T Interval 422 ms    QTC Calculation(Bazett) 442 ms    P Axis 21 degrees    R Axis -26 degrees    T Axis 14 degrees    Diagnosis Line Sinus rhythm withpremature atrial complexes  Nonspecific ST abnormality  Abnormal ECG    Confirmed by Ashlee Silverman ([id number]) on 3/23/2025 12:29:09 PM (03-22-25 @ 16:53)       
[facility]  IAN Arellano Y. Patel, S. Shah, G. [facility]  [phone]    Name: GINA BOWDEN  Age: 74y  Gender: Male  MRN: [id number]    Interval History:  No acute overnight events.   Notes reviewed    Antibiotics:  remdesivir  IVPB 100 milliGRAM(s) IV Intermittent every 24 hours  remdesivir  IVPB   IV Intermittent       Medications:  acetaminophen     Tablet .. 650 milliGRAM(s) Oral every 6 hours PRN  ascorbic acid 500 milliGRAM(s) Oral daily  clopidogrel Tablet 75 milliGRAM(s) Oral daily  dextrose 5%. 1000 milliLiter(s) IV Continuous <Continuous>  dextrose 5%. 1000 milliLiter(s) IV Continuous <Continuous>  dextrose 50% Injectable 25 Gram(s) IV Push once  dextrose 50% Injectable 12.5 Gram(s) IV Push once  dextrose 50% Injectable 25 Gram(s) IV Push once  dextrose Oral Gel 15 Gram(s) Oral once PRN  glucagon  Injectable 1 milliGRAM(s) IntraMuscular once  heparin   Injectable 5000 Unit(s) SubCutaneous every 8 hours  insulin lispro (ADMELOG) corrective regimen sliding scale   SubCutaneous three times a day before meals  insulin lispro (ADMELOG) corrective regimen sliding scale   SubCutaneous at bedtime  losartan 50 milliGRAM(s) Oral daily  magnesium oxide 400 milliGRAM(s) Oral daily  melatonin 3 milliGRAM(s) Oral at bedtime PRN  metoprolol succinate  milliGRAM(s) Oral daily  pantoprazole    Tablet 40 milliGRAM(s) Oral before breakfast  PARoxetine 20 milliGRAM(s) Oral daily  potassium chloride    Tablet ER 40 milliEquivalent(s) Oral every 4 hours  remdesivir  IVPB 100 milliGRAM(s) IV Intermittent every 24 hours  remdesivir  IVPB   IV Intermittent   rosuvastatin 40 milliGRAM(s) Oral at bedtime  tamsulosin 0.4 milliGRAM(s) Oral at bedtime      Review of Systems:  A 10-point review of systems was obtained.   Review of systems otherwise negative except as previously noted.    Allergies: codeine (Vomiting; Nausea)  tramadol (Chills; Nausea)  codeine (Other)  tramadol (Rash)    For details regarding the patient's past medical history, social history, family history, and other miscellaneous elements, please refer the initial infectious diseases consultation and/or the admitting history and physical examination for this admission.    Objective:  Vitals:   T(C): 36.7 (03-23-25 @ 11:22), Max: 37.1 (03-22-25 @ 12:00)  HR: 57 (03-23-25 @ 11:22) (57 - 72)  BP: 121/66 (03-23-25 @ 11:22) (112/62 - 139/47)  RR: 17 (03-23-25 @ 11:22) (17 - 18)  SpO2: 93% (03-23-25 @ 11:22) (93% - 99%)    Physical Examination:  General: no acute distress  HEENT: NC/AT, EOMI  Cardio: RRR  Resp: breath sounds heard bilaterally  Abd: soft, NT, ND  Ext: no edema or cyanosis  Skin: warm, dry, no visible rash      Laboratory Studies:  CBC:                       12.3   5.26  )-----------( 146      ( 23 Mar 2025 07:55 )             36.1     CMP: 03-23    138  |  106  |  15  ----------------------------<  112[H]  3.2[L]   |  25  |  0.56    Ca    8.5      23 Mar 2025 07:55    TPro  6.2  /  Alb  3.1[L]  /  TBili  0.5  /  DBili  0.1  /  AST  9[L]  /  ALT  19  /  AlkPhos  46  03-23    LIVER FUNCTIONS - ( 23 Mar 2025 07:55 )  Alb: 3.1 g/dL / Pro: 6.2 g/dL / ALK PHOS: 46 U/L / ALT: 19 U/L / AST: 9 U/L / GGT: x           Urinalysis Basic - ( 23 Mar 2025 07:55 )    Color: x / Appearance: x / SG: x / pH: x  Gluc: 112 mg/dL / Ketone: x  / Bili: x / Urobili: x   Blood: x / Protein: x / Nitrite: x   Leuk Esterase: x / RBC: x / WBC x   Sq Epi: x / Non Sq Epi: x / Bacteria: x        Microbiology: reviewed    Urinalysis with Rflx Culture (collected 03-22-25 @ 09:00)          Radiology: reviewed      
[facility] -- Wang Spence, Sheldon Wahl Savella, Goodger, Cohen: Office # [phone]    Follow Up:  SR PAC    Subjective/Observations: Patient awake, alert, resting in bed. Denies chest pain, palpitations and dizziness. Denies any difficulty breathing. No orthopnea and PND. Tolerating room air.     REVIEW OF SYSTEMS: All other review of systems are negative unless indicated above    PAST MEDICAL & SURGICAL HISTORY:  Hypertension      Hypertension      CAD (coronary artery disease)      Hyperlipidemia      Type 2 diabetes mellitus  prediabetes      H/O: depression  mild      GERD (gastroesophageal reflux disease)      Hydrocephalus      2019 novel coronavirus disease (COVID-19)      S/P  shunt      Stented coronary artery  3 stents      S/P bilateral inguinal hernia repair      S/P quadruple vessel bypass      S/P cervical spinal fusion  no hardware      H/O melanoma excision          MEDICATIONS  (STANDING):  ascorbic acid 500 milliGRAM(s) Oral daily  clopidogrel Tablet 75 milliGRAM(s) Oral daily  dextrose 5%. 1000 milliLiter(s) (50 mL/Hr) IV Continuous <Continuous>  dextrose 5%. 1000 milliLiter(s) (100 mL/Hr) IV Continuous <Continuous>  dextrose 50% Injectable 25 Gram(s) IV Push once  dextrose 50% Injectable 12.5 Gram(s) IV Push once  dextrose 50% Injectable 25 Gram(s) IV Push once  glucagon  Injectable 1 milliGRAM(s) IntraMuscular once  heparin   Injectable 5000 Unit(s) SubCutaneous every 8 hours  insulin lispro (ADMELOG) corrective regimen sliding scale   SubCutaneous three times a day before meals  insulin lispro (ADMELOG) corrective regimen sliding scale   SubCutaneous at bedtime  losartan 50 milliGRAM(s) Oral daily  magnesium oxide 400 milliGRAM(s) Oral daily  metoprolol succinate  milliGRAM(s) Oral daily  pantoprazole    Tablet 40 milliGRAM(s) Oral before breakfast  PARoxetine 20 milliGRAM(s) Oral daily  rosuvastatin 40 milliGRAM(s) Oral at bedtime  tamsulosin 0.4 milliGRAM(s) Oral at bedtime    MEDICATIONS  (PRN):  acetaminophen     Tablet .. 650 milliGRAM(s) Oral every 6 hours PRN Temp greater or equal to 38C (100.4F), Mild Pain (1 - 3)  artificial tears (preservative free) Ophthalmic Solution 1 Drop(s) Both EYES every 4 hours PRN Dry Eyes  clonazePAM  Tablet 0.25 milliGRAM(s) Oral daily PRN agitation  dextrose Oral Gel 15 Gram(s) Oral once PRN Blood Glucose LESS THAN 70 milliGRAM(s)/deciliter    Allergies    codeine (Vomiting; Nausea)  tramadol (Chills; Nausea)  codeine (Other)  tramadol (Rash)    Intolerances      Vital Signs Last 24 Hrs  T(C): 36.6 (25 Mar 2025 04:22), Max: 36.7 (24 Mar 2025 12:02)  T(F): 97.9 (25 Mar 2025 04:22), Max: 98 (24 Mar 2025 12:02)  HR: 83 (25 Mar 2025 04:22) (59 - 83)  BP: 119/74 (25 Mar 2025 04:22) (119/74 - 146/70)  BP(mean): --  RR: 18 (25 Mar 2025 04:22) (18 - 18)  SpO2: 97% (25 Mar 2025 04:22) (96% - 97%)    Parameters below as of 25 Mar 2025 04:22  Patient On (Oxygen Delivery Method): room air      I&O's Summary    24 Mar 2025 07:01  -  25 Mar 2025 07:00  --------------------------------------------------------  IN: 0 mL / OUT: 1201 mL / NET: -1201 mL    25 Mar 2025 07:01  -  25 Mar 2025 09:20  --------------------------------------------------------  IN: 0 mL / OUT: 1000 mL / NET: -1000 mL          TELE: Not on telemetry   PHYSICAL EXAM:  Constitutional: NAD, awake and alert  HEENT: Moist Mucous Membranes, Anicteric  Pulmonary: Non-labored, breath sounds are clear bilaterally, Diminished at bases No wheezing, rales or rhonchi  Cardiovascular: Regular, S1 and S2, No murmurs, No rubs, gallops or clicks  Gastrointestinal:  soft, nontender, nondistended   MSK: Rt ortho boot  Lymph: No peripheral edema. No lymphadenopathy.   Skin: No visible rashes or ulcers.  Psych:  Mood & affect appropriate      LABS: All Labs Reviewed:                        12.9   3.39  )-----------( 177      ( 25 Mar 2025 05:55 )             37.6                         12.6   4.70  )-----------( 163      ( 24 Mar 2025 06:10 )             37.1                         12.3   5.26  )-----------( 146      ( 23 Mar 2025 07:55 )             36.1     25 Mar 2025 05:55    138    |  106    |  12     ----------------------------<  112    3.7     |  27     |  0.57   24 Mar 2025 06:10    137    |  107    |  12     ----------------------------<  124    3.8     |  24     |  0.59   23 Mar 2025 07:55    138    |  106    |  15     ----------------------------<  112    3.2     |  25     |  0.56     Ca    8.7        25 Mar 2025 05:55  Ca    8.7        24 Mar 2025 06:10  Ca    8.5        23 Mar 2025 07:55  Mg     2.1       24 Mar 2025 06:10    TPro  5.9    /  Alb  3.1    /  TBili  0.4    /  DBili  0.1    /  AST  12     /  ALT  17     /  AlkPhos  49     25 Mar 2025 05:55  TPro  5.9    /  Alb  3.2    /  TBili  0.4    /  DBili  0.1    /  AST  13     /  ALT  16     /  AlkPhos  46     24 Mar 2025 06:10  TPro  6.2    /  Alb  3.1    /  TBili  0.5    /  DBili  0.1    /  AST  9      /  ALT  19     /  AlkPhos  46     23 Mar 2025 07:55   LIVER FUNCTIONS - ( 25 Mar 2025 05:55 )  Alb: 3.1 g/dL / Pro: 5.9 g/dL / ALK PHOS: 49 U/L / ALT: 17 U/L / AST: 12 U/L / GGT: x           PT/INR - ( 25 Mar 2025 05:55 )   PT: 12.0 sec;   INR: 1.03 ratio      12 Lead ECG:   Ventricular Rate 66 BPM    Atrial Rate 66 BPM    P-R Interval 136 ms    QRS Duration 100 ms    Q-T Interval 422 ms    QTC Calculation(Bazett) 442 ms    P Axis 21 degrees    R Axis -26 degrees    T Axis 14 degrees    Diagnosis Line Sinus rhythm withpremature atrial complexes  Nonspecific ST abnormality  Abnormal ECG    Confirmed by Ashlee Silverman ([id number]) on 3/23/2025 12:29:09 PM (03-22-25 @ 16:53)

## 2025-03-25 NOTE — DISCHARGE NOTE NURSING/CASE MANAGEMENT/SOCIAL WORK - NSSCNAMETXT_GEN_ALL_CORE
you have been offered home care, you have stated you prefer to return to your outpt PT at [facility]

## 2025-03-25 NOTE — CASE MANAGEMENT PROGRESS NOTE - NSCMPROGRESSNOTE_GEN_ALL_CORE
Per MD pt is medically cleared for discharge today 3/25/25. CM met with patient to discuss transition of care. Pt called spouse with CM in the room and gave verbal consent to speak with spouse via telephone. Pt is to be transitioned to home with no formal home care services. PT recommended home care PT. Pt wishes to continue with outpt PT at [facility] and declines need for PT script. Pt states he has privately hired aide services 12 hours daily / 7 days weekly and states his wife will also assist post discharge. Pt aware of plan and in agreement / verbalizes understanding. IMM discussed / given. Pt verbalized understanding. Confirmed pharmacy is [facility] [place]. community MD is Dr. Temple. Pt stated they would make their own follow up appointments. Per pt DMEs in home include walker, cane, wheelchair. Pt stated his wife will transport pt home. All questions answered. CM discussed plan with MD and RN. CM to remain available through hospitalization.

## 2025-03-25 NOTE — PROGRESS NOTE ADULT - ASSESSMENT
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 Cardiology called for ? A fib. Follows [facility] for cardiology.       Cardiac Arrhythmia (Afib?), CAD, HTN  - No A fib noted on tele  - Maintaining NSR on tele, now off tele   - EP interrogated device No episodes of tachycardia, Bradycardia, Pause,  High grade block or symptoms per patient.   - No clear indication for AC at this point    - Known CAD s/p CABG  - EKG showed SR PAC @ 66  - No evidence of any active ischemia.  - Continue Plavix and statin   - Continue Toprol    - BP stable at systolic 110-140s   - Continue Losartan to 50mg daily    - No evidence of any meaningful volume overload  - No baseline TTE on record.  Follows with Dr. Carrera  - Can do routinely    - Pt p/w confusion, found to have COVID.  - ID following   - Management per primary team     - BP stable and controlled     - D/c planning per primary team.   - Monitor and replete lytes, keep K>4, Mg>2.  - Will continue to follow.    Glen Cervantes, MS FNP, AGACNP  Nurse Practitioner- Cardiology   Please call on TEAMS         
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 Cardiology called for ? A fib. Follows [facility] for cardiology.     Cardiac Arrhythmia (Afib?), CAD, HTN  - EKG showed NSR  - Maintaining NSR on tele.  Will continue for now  - Patient with ILR.  EP to interrogate  - No clear indication for AC at thi point, pending ILR interrogations     - Known CAD s/p CABG  - EKG showed SR PAC @ 66  - No evidence of any active ischemia.  No anginal complaints  - Continue Plavix and statin   - Continue Toprol    - BP stable at systolic 120's  - Continue Losartan to 50mg daily    - No evidence of any meaningful volume overload  - No baseline TTE on record.  Follows with Dr. Carrera  - Can do routinely    - Pt p/w confusion, found to have COVID.  - ID following   - Management per primary team     - BP stable and controlled   - Monitor and replete lytes, keep K>4, Mg>2.  - Will continue to follow.    Gauri Houston DNP, NP-C, AGACNP-C  Cardiology   Call TEAMS       
Mr Alicia is a 75yo M presenting with AMS found to have COVID. 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    AMS/TME  COVID  - CTH negative  - CXR clear    Recommendations:   S/p remdesivir x3 day course  No indication for steroids at this time  No evidence of PNA on imaging, monitor off ABx  Trend temps/WBC  Supportive measures  Isolation per IC policy  Additional care per primary team    Patient evaluated with face-to-face time in addition to reviewing history, labs, microbiology, and imaging.   Antibiotic stewardship, local antibiogram, infection control strategies and potential transmission issues taken into consideration at time of treatment decision making process.   Thank you for allowing us to participate in the care of your patient.  Infectious Diseases will follow. Please call with any questions.  Janee Lockhart M.D.  Available on [employer] TEAMS -- *[facility]*  [facility] [phone]  For after 5 P.M. and weekends, please call [phone]  
Mr Vargas is a 73yo M presenting with AMS found to have COVID. 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    COVID-19: likely contributing to his AMS.   - CT head:   No acute cerebral cortical infarct is seen.  No intracranial hemorrhage is found.  No mass effect is found in the brain.  -s/p Remdesivir x 3 doses  -Not hypoxic and currently no need for decadron  -Remains on room air  -ID consulted Dr Janee Lockhart  -Pulmonary consulted    HLD: continue rosuvastatin    CAD s/p CABG: continue Toprol, statin, Plavix, losartan    HTN: continue losartan    Diabetes type 2: check A1c  -patient takes SGLT-2 inhibitor, will hold  -continue low dose lispro sliding scale    Mood disorder: continue paxil    BPH: continue flomax    NPH s/p  shunt: CT head with stable examination     Hx of CVA: now has loop recorder placed to eval for arrythmia such as A fib as contributing cause to previous stroke  -continue plavix and statin    ? Afib: on cardiac monitor appears like A fib.   -EKG: NSR @74bpm   -ILR interrogation  -cardio consulted Dr Silverman    DVT ppx: heparin  
73yo M presenting with AMS found to have COVID. 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     covid  weakness  viral syndrome  episodic hypoxemia -   cad  CABG hx  HTN  DM  HLD  NPH - BP shunt  CVA hx    remdesivir  monitor for hypoxemia and the need for Decadron  consideration for D dimer check if pt cont to drop sats   dvt p  on antiplatelet rx   I perla  isol precs  cough rx regimen  cvs rx regimen  BP control  DM care  monitor mentation and alertness  nutrition   
Mr Alicia is a 75yo M presenting with AMS found to have COVID. 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    AMS/TME  COVID  - CTH negative  - CXR clear    Recommendations:   C/w remdesivir x3 day course until 3/24  No indication for steroids at this time  No evidence of PNA on imaging, monitor off ABx  Trend temps/WBC  Monitor LFTs while on remdesivir  Supportive measures  Isolation per IC policy  Additional care per primary team    Patient evaluated with face-to-face time in addition to reviewing history, labs, microbiology, and imaging.   Antibiotic stewardship, local antibiogram, infection control strategies and potential transmission issues taken into consideration at time of treatment decision making process.   Thank you for allowing us to participate in the care of your patient.  Infectious Diseases will follow. Please call with any questions.  Janee Lockhart M.D.  Available on Microsoft TEAMS -- *[facility] [phone]  For after 5 P.M. and weekends, please call [phone]  
73yo M presenting with AMS found to have COVID. 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     covid  weakness  viral syndrome  episodic hypoxemia -   cad  CABG hx  HTN  DM  HLD  NPH - BP shunt  CVA hx    monitor for hypoxemia and the need for Decadron  consideration for D dimer check if pt cont to drop sats   dvt p  on antiplatelet rx   I perla  isol precs  cough rx regimen  cvs rx regimen  BP control  DM care  monitor mentation and alertness  nutrition 
Mr Vargas is a 75yo M presenting with AMS found to have COVID. 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    COVID-19: likely contributing to his AMS.   - CT head:   No acute cerebral cortical infarct is seen.  No intracranial hemorrhage is found.  No mass effect is found in the brain.  -will continue Remdesivir  -Not hypoxic and currently no need for decadron  -Remains on room air  -ID consulted Dr Janee Lockhart  -Pulmonary consulted    HLD: continue rosuvastatin    CAD s/p CABG: continue Toprol, statin, Plavix, losartan    HTN: continue losartan    Diabetes type 2: check A1c  -patient takes SGLT-2 inhibitor, will hold  -continue low dose lispro sliding scale    Mood disorder: continue paxil    BPH: continue flomax    NPH s/p  shunt: CT head with stable examination     Hx of CVA: now has loop recorder placed to eval for arrythmia such as A fib as contributing cause to previous stroke  -continue plavix and statin    ? Afib: on cardiac monitor appears like A fib.   -EKG: NSR @74bpm   -cardio consult Dr Silverman    DVT ppx: heparin    ---  TIME SPENT:  51 minutes spent on total encounter. The necessity of the time spent during the encounter on this date of service was due to:     direct patient care including but not limited to reviewing chart, medications ,laboratory data, imaging reports, discussion of plan of care with consultants on the case, coordination of care with multidisciplinary team involved in the case and discussion of plan with patient.  Patient agreeable to plan of care and verbalized understanding the anticipated hospital course and treatment plan.    ---
Mr Alicia is a 73yo M presenting with AMS found to have COVID. 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    AMS/TME  COVID  - CTH negative  - CXR clear    Recommendations:   S/p remdesivir x3 day course  No indication for steroids at this time  No evidence of PNA on imaging, monitor off ABx  Trend temps/WBC  Supportive measures  Isolation per IC policy  Additional care per primary team    D/c planning per primary team    Patient evaluated with face-to-face time in addition to reviewing history, labs, microbiology, and imaging.   Antibiotic stewardship, local antibiogram, infection control strategies and potential transmission issues taken into consideration at time of treatment decision making process.   Thank you for allowing us to participate in the care of your patient.  Infectious Diseases will follow. Please call with any questions.  Janee Lochkart M.D.  Available on [employer] TEAMS -- *[facility] Infectious Diseases [phone]  For after 5 P.M. and weekends, please call [phone]

## 2025-03-25 NOTE — DISCHARGE NOTE NURSING/CASE MANAGEMENT/SOCIAL WORK - FINANCIAL ASSISTANCE
[facility] provides services at a reduced cost to those who are determined to be eligible through [facility]’s financial assistance program. Information regarding [facility]’s financial assistance program can be found by going to [email or web address] or by calling [phone].

## 2025-03-25 NOTE — PROGRESS NOTE ADULT - PROVIDER SPECIALTY LIST ADULT
Infectious Disease
Pulmonology
Cardiology
Hospitalist
Cardiology
Hospitalist
Infectious Disease
Pulmonology
Infectious Disease

## 2025-03-27 LAB
CULTURE RESULTS: SIGNIFICANT CHANGE UP
CULTURE RESULTS: SIGNIFICANT CHANGE UP
SPECIMEN SOURCE: SIGNIFICANT CHANGE UP
SPECIMEN SOURCE: SIGNIFICANT CHANGE UP

## 2025-08-19 ENCOUNTER — APPOINTMENT (OUTPATIENT)
Dept: ORTHOPEDIC SURGERY | Facility: CLINIC | Age: 75
End: 2025-08-19

## (undated) DEVICE — DRSG DERMABOND 0.7ML

## (undated) DEVICE — SUT POLYSORB 2-0 30" V-20 UNDYED

## (undated) DEVICE — APPLICATOR COTTON TIP 3" STERILE

## (undated) DEVICE — DRAPE 1/2 SHEET 40X57"

## (undated) DEVICE — PREP CHLORAPREP HI-LITE ORANGE 26ML

## (undated) DEVICE — BLADE SCALPEL SAFETYLOCK #15

## (undated) DEVICE — SUT MONOCRYL 4-0 27" PS-2 UNDYED

## (undated) DEVICE — SUT MONOCRYL 5-0 18" P-3 UNDYED

## (undated) DEVICE — VENODYNE/SCD SLEEVE CALF MEDIUM

## (undated) DEVICE — ELCTR BOVIE PENCIL SMOKE EVACUATION

## (undated) DEVICE — GLV 7.5 PROTEXIS (WHITE)

## (undated) DEVICE — PREP BETADINE SPONGE STICKS

## (undated) DEVICE — PACK MINOR WITH LAP

## (undated) DEVICE — SUT SOFSILK 2-0 18" C-15

## (undated) DEVICE — ELCTR BOVIE PENCIL HANDPIECE

## (undated) DEVICE — SPONGE PEANUT AUTO COUNT

## (undated) DEVICE — DRSG STERISTRIPS 0.5 X 4"

## (undated) DEVICE — SUT POLYSORB 3-0 30" V-20 UNDYED

## (undated) DEVICE — DRAPE 3/4 SHEET W REINFORCEMENT 56X77"

## (undated) DEVICE — DRSG XEROFORM 5 X 9"

## (undated) DEVICE — STAPLER SKIN VISI-STAT 35 WIDE

## (undated) DEVICE — MARKING PEN W RULER

## (undated) DEVICE — BLADE SCALPEL SAFETYLOCK #10

## (undated) DEVICE — GAMMA SLEEVE DISPOSABLE

## (undated) DEVICE — DRAPE TOWEL BLUE 17" X 24"

## (undated) DEVICE — DRSG MASTISOL

## (undated) DEVICE — POSITIONER FOAM EGG CRATE ULNAR 2PCS (PINK)

## (undated) DEVICE — VISITEC 4X4

## (undated) DEVICE — SPECIMEN CONTAINER 100ML

## (undated) DEVICE — DRSG ACE BANDAGE 4"

## (undated) DEVICE — COTTONBALL PREPPING LG NS

## (undated) DEVICE — DRAPE INSTRUMENT POUCH 6.75" X 11"

## (undated) DEVICE — STAPLER SKIN PROXIMATE

## (undated) DEVICE — SUT PLAIN GUT 4-0 30" C-13

## (undated) DEVICE — ZIMMER BLADE DERMATONE

## (undated) DEVICE — DRSG KLING 6"

## (undated) DEVICE — MEDICATION LABELS W MARKER

## (undated) DEVICE — WARMING BLANKET LOWER ADULT

## (undated) DEVICE — SOL IRR POUR NS 0.9% 500ML

## (undated) DEVICE — VENODYNE/SCD SLEEVE CALF LARGE

## (undated) DEVICE — FOLEY TRAY 16FR 5CC LTX UMETER CLOSED

## (undated) DEVICE — GOWN TRIMAX LG

## (undated) DEVICE — TONGUE DEPRESSOR

## (undated) DEVICE — NDL HYPO SAFE 25G X 1.5" (ORANGE)

## (undated) DEVICE — WARMING BLANKET UPPER ADULT

## (undated) DEVICE — SYR SAFE TB 1CC 27G X 0.5

## (undated) DEVICE — BLADE SURGICAL #10 STAINLESS

## (undated) DEVICE — DRAPE 3/4 SHEET 52X76"

## (undated) DEVICE — SOL IRR POUR H2O 250ML

## (undated) DEVICE — LIGASURE SMALL JAW

## (undated) DEVICE — DRSG DERMABOND PRINEO 22CM

## (undated) DEVICE — POSITIONER FOAM HEADREST (PINK)

## (undated) DEVICE — DRSG TEGADERM 4X4.75"

## (undated) DEVICE — ELCTR STRYKER NEPTUNE SMOKE EVACUATION PENCIL (GREEN)

## (undated) DEVICE — LAP PAD 18 X 18"

## (undated) DEVICE — SUT SOFSILK 2-0 18" C-23

## (undated) DEVICE — PACK MINOR

## (undated) DEVICE — DRAIN JACKSON PRATT 7MM FLAT FULL NO TROCAR

## (undated) DEVICE — DRAPE LIGHT HANDLE COVER (BLUE)

## (undated) DEVICE — DRAPE MAGNETIC INSTRUMENT MEDIUM

## (undated) DEVICE — DRSG CURITY GAUZE SPONGE 4 X 4" 12-PLY

## (undated) DEVICE — SYR LUER LOK 10CC

## (undated) DEVICE — DRSG WEBRIL 4"

## (undated) DEVICE — GLV 8 PROTEXIS (WHITE)